# Patient Record
Sex: FEMALE | Race: WHITE | NOT HISPANIC OR LATINO | Employment: UNEMPLOYED | URBAN - METROPOLITAN AREA
[De-identification: names, ages, dates, MRNs, and addresses within clinical notes are randomized per-mention and may not be internally consistent; named-entity substitution may affect disease eponyms.]

---

## 2017-02-23 ENCOUNTER — APPOINTMENT (OUTPATIENT)
Dept: ULTRASOUND IMAGING | Facility: HOSPITAL | Age: 22
End: 2017-02-23

## 2017-02-23 ENCOUNTER — ANESTHESIA (OUTPATIENT)
Dept: LABOR AND DELIVERY | Facility: HOSPITAL | Age: 22
End: 2017-02-23

## 2017-02-23 ENCOUNTER — ANESTHESIA EVENT (OUTPATIENT)
Dept: LABOR AND DELIVERY | Facility: HOSPITAL | Age: 22
End: 2017-02-23

## 2017-02-23 ENCOUNTER — HOSPITAL ENCOUNTER (INPATIENT)
Facility: HOSPITAL | Age: 22
LOS: 2 days | Discharge: HOME | End: 2017-02-25
Attending: EMERGENCY MEDICINE | Admitting: OBSTETRICS & GYNECOLOGY

## 2017-02-23 DIAGNOSIS — O26.899 PREGNANCY RELATED ABDOMINAL PAIN OF LOWER QUADRANT, ANTEPARTUM: ICD-10-CM

## 2017-02-23 DIAGNOSIS — Z3A.34 34 WEEKS GESTATION OF PREGNANCY: Primary | ICD-10-CM

## 2017-02-23 DIAGNOSIS — R10.30 PREGNANCY RELATED ABDOMINAL PAIN OF LOWER QUADRANT, ANTEPARTUM: ICD-10-CM

## 2017-02-23 PROBLEM — Z37.9 NORMAL LABOR: Status: ACTIVE | Noted: 2017-02-23

## 2017-02-23 LAB
ABO GROUP BLD: NORMAL
ABO GROUP BLD: NORMAL
ALBUMIN SERPL-MCNC: 3.9 G/DL (ref 3.2–4.8)
ALBUMIN/GLOB SERPL: 1.3 G/DL (ref 1.5–2.5)
ALP SERPL-CCNC: 124 U/L (ref 25–100)
ALP SERPL-CCNC: 124 U/L (ref 25–100)
ALT SERPL W P-5'-P-CCNC: 10 U/L (ref 7–40)
ALT SERPL W P-5'-P-CCNC: 10 U/L (ref 7–40)
AMPHET+METHAMPHET UR QL: NEGATIVE
AMPHETAMINES UR QL: NEGATIVE
ANION GAP SERPL CALCULATED.3IONS-SCNC: 6 MMOL/L (ref 3–11)
AST SERPL-CCNC: 21 U/L (ref 0–33)
AST SERPL-CCNC: 21 U/L (ref 0–33)
ATMOSPHERIC PRESS: ABNORMAL MMHG
ATMOSPHERIC PRESS: ABNORMAL MMHG
B-HCG UR QL: POSITIVE
BACTERIA UR QL AUTO: ABNORMAL /HPF
BARBITURATES UR QL SCN: NEGATIVE
BASE EXCESS BLDCOA CALC-SCNC: -2.2 MMOL/L (ref 0–2)
BASE EXCESS BLDCOV CALC-SCNC: -2.4 MMOL/L (ref 0–2)
BASOPHILS # BLD AUTO: 0.02 10*3/MM3 (ref 0–0.2)
BASOPHILS # BLD AUTO: 0.03 10*3/MM3 (ref 0–0.2)
BASOPHILS NFR BLD AUTO: 0.1 % (ref 0–1)
BASOPHILS NFR BLD AUTO: 0.2 % (ref 0–1)
BDY SITE: ABNORMAL
BDY SITE: ABNORMAL
BENZODIAZ UR QL SCN: NEGATIVE
BILIRUB SERPL-MCNC: 0.4 MG/DL (ref 0.3–1.2)
BILIRUB SERPL-MCNC: 0.4 MG/DL (ref 0.3–1.2)
BILIRUB UR QL STRIP: NEGATIVE
BLD GP AB SCN SERPL QL: NEGATIVE
BUN BLD-MCNC: 6 MG/DL (ref 9–23)
BUN/CREAT SERPL: 12 (ref 7–25)
BUPRENORPHINE SERPL-MCNC: NEGATIVE NG/ML
CALCIUM SPEC-SCNC: 9.5 MG/DL (ref 8.7–10.4)
CANNABINOIDS SERPL QL: NEGATIVE
CHLORIDE SERPL-SCNC: 109 MMOL/L (ref 99–109)
CLARITY UR: ABNORMAL
CO2 BLDA-SCNC: 24.4 MMOL/L (ref 22–33)
CO2 BLDA-SCNC: 24.9 MMOL/L (ref 22–33)
CO2 SERPL-SCNC: 23 MMOL/L (ref 20–31)
COCAINE UR QL: NEGATIVE
COLOR UR: ABNORMAL
CREAT BLD-MCNC: 0.5 MG/DL (ref 0.6–1.3)
CREAT BLD-MCNC: 0.5 MG/DL (ref 0.6–1.3)
DEPRECATED RDW RBC AUTO: 47.9 FL (ref 37–54)
DEPRECATED RDW RBC AUTO: 48.2 FL (ref 37–54)
EOSINOPHIL # BLD AUTO: 0.01 10*3/MM3 (ref 0.1–0.3)
EOSINOPHIL # BLD AUTO: 0.03 10*3/MM3 (ref 0.1–0.3)
EOSINOPHIL NFR BLD AUTO: 0.1 % (ref 0–3)
EOSINOPHIL NFR BLD AUTO: 0.2 % (ref 0–3)
ERYTHROCYTE [DISTWIDTH] IN BLOOD BY AUTOMATED COUNT: 13.8 % (ref 11.3–14.5)
ERYTHROCYTE [DISTWIDTH] IN BLOOD BY AUTOMATED COUNT: 13.9 % (ref 11.3–14.5)
GFR SERPL CREATININE-BSD FRML MDRD: >150 ML/MIN/1.73
GLOBULIN UR ELPH-MCNC: 3 GM/DL
GLUCOSE BLD-MCNC: 95 MG/DL (ref 70–100)
GLUCOSE UR STRIP-MCNC: NEGATIVE MG/DL
HBV SURFACE AG SERPL QL IA: NORMAL
HCG INTACT+B SERPL-ACNC: NORMAL MIU/ML
HCO3 BLDCOA-SCNC: 23.6 MMOL/L (ref 16.9–20.5)
HCO3 BLDCOV-SCNC: 23.1 MMOL/L (ref 18.6–21.4)
HCT VFR BLD AUTO: 36.5 % (ref 34.5–44)
HCT VFR BLD AUTO: 37.6 % (ref 34.5–44)
HCV AB SER DONR QL: NORMAL
HGB BLD-MCNC: 12.3 G/DL (ref 11.5–15.5)
HGB BLD-MCNC: 12.6 G/DL (ref 11.5–15.5)
HGB BLDA-MCNC: 18.3 G/DL (ref 14–18)
HGB BLDA-MCNC: 18.6 G/DL (ref 14–18)
HGB F BLD QL KLEIH BETKE: NORMAL
HGB UR QL STRIP.AUTO: ABNORMAL
HIV1+2 AB SER QL: NORMAL
HOROWITZ INDEX BLD+IHG-RTO: 21 %
HOROWITZ INDEX BLD+IHG-RTO: 21 %
HYALINE CASTS UR QL AUTO: ABNORMAL /LPF
IMM GRANULOCYTES # BLD: 0.09 10*3/MM3 (ref 0–0.03)
IMM GRANULOCYTES # BLD: 0.09 10*3/MM3 (ref 0–0.03)
IMM GRANULOCYTES NFR BLD: 0.5 % (ref 0–0.6)
IMM GRANULOCYTES NFR BLD: 0.5 % (ref 0–0.6)
INTERNAL NEGATIVE CONTROL: NEGATIVE
INTERNAL POSITIVE CONTROL: POSITIVE
KETONES UR QL STRIP: ABNORMAL
LDH SERPL-CCNC: 191 U/L (ref 120–246)
LEUKOCYTE ESTERASE UR QL STRIP.AUTO: ABNORMAL
LYMPHOCYTES # BLD AUTO: 1.98 10*3/MM3 (ref 0.6–4.8)
LYMPHOCYTES # BLD AUTO: 1.99 10*3/MM3 (ref 0.6–4.8)
LYMPHOCYTES NFR BLD AUTO: 10.1 % (ref 24–44)
LYMPHOCYTES NFR BLD AUTO: 10.6 % (ref 24–44)
Lab: ABNORMAL
MCH RBC QN AUTO: 31.5 PG (ref 27–31)
MCH RBC QN AUTO: 31.8 PG (ref 27–31)
MCHC RBC AUTO-ENTMCNC: 33.5 G/DL (ref 32–36)
MCHC RBC AUTO-ENTMCNC: 33.7 G/DL (ref 32–36)
MCV RBC AUTO: 94 FL (ref 80–99)
MCV RBC AUTO: 94.3 FL (ref 80–99)
METHADONE UR QL SCN: NEGATIVE
MODALITY: ABNORMAL
MODALITY: ABNORMAL
MONOCYTES # BLD AUTO: 0.67 10*3/MM3 (ref 0–1)
MONOCYTES # BLD AUTO: 0.68 10*3/MM3 (ref 0–1)
MONOCYTES NFR BLD AUTO: 3.5 % (ref 0–12)
MONOCYTES NFR BLD AUTO: 3.6 % (ref 0–12)
NEUTROPHILS # BLD AUTO: 15.95 10*3/MM3 (ref 1.5–8.3)
NEUTROPHILS # BLD AUTO: 16.87 10*3/MM3 (ref 1.5–8.3)
NEUTROPHILS NFR BLD AUTO: 85 % (ref 41–71)
NEUTROPHILS NFR BLD AUTO: 85.6 % (ref 41–71)
NITRITE UR QL STRIP: NEGATIVE
NUMBER OF DOSES: NORMAL
OPIATES UR QL: POSITIVE
OXYCODONE UR QL SCN: NEGATIVE
PCO2 BLDCOA: 43.7 MMHG (ref 43.3–54.9)
PCO2 BLDCOV: 41.8 MM HG (ref 30–60)
PCP UR QL SCN: NEGATIVE
PH BLDCOA: 7.34 [PH] (ref 7.2–7.3)
PH BLDCOV: 7.35 [PH] (ref 7.19–7.46)
PH UR STRIP.AUTO: 6.5 [PH] (ref 5–8)
PLATELET # BLD AUTO: 208 10*3/MM3 (ref 150–450)
PLATELET # BLD AUTO: 221 10*3/MM3 (ref 150–450)
PMV BLD AUTO: 11.6 FL (ref 6–12)
PMV BLD AUTO: 11.8 FL (ref 6–12)
PO2 BLDCOA: 24.5 MMHG (ref 11.5–43.3)
PO2 BLDCOV: 26.5 MM HG
POTASSIUM BLD-SCNC: 3.7 MMOL/L (ref 3.5–5.5)
PROPOXYPH UR QL: NEGATIVE
PROT SERPL-MCNC: 6.9 G/DL (ref 5.7–8.2)
PROT UR QL STRIP: ABNORMAL
RBC # BLD AUTO: 3.87 10*6/MM3 (ref 3.89–5.14)
RBC # BLD AUTO: 4 10*6/MM3 (ref 3.89–5.14)
RBC # UR: ABNORMAL /HPF
REF LAB TEST METHOD: ABNORMAL
RH BLD: POSITIVE
RH BLD: POSITIVE
RUBV IGG SER QL: NORMAL
RUBV IGG SER-ACNC: 45.5 IU/ML
SAO2 % BLDCOA: 55.7 %
SAO2 % BLDCOA: ABNORMAL % (ref 45–75)
SAO2 % BLDCOV: 60.9 %
SODIUM BLD-SCNC: 138 MMOL/L (ref 132–146)
SP GR UR STRIP: 1.03 (ref 1–1.03)
SQUAMOUS #/AREA URNS HPF: ABNORMAL /HPF
T4 FREE SERPL-MCNC: 0.98 NG/DL (ref 0.89–1.76)
TRICYCLICS UR QL SCN: NEGATIVE
TSH SERPL DL<=0.05 MIU/L-ACNC: 3.5 MIU/ML (ref 0.35–5.35)
URATE SERPL-MCNC: 3.7 MG/DL (ref 3.1–7.8)
UROBILINOGEN UR QL STRIP: ABNORMAL
WBC NRBC COR # BLD: 18.74 10*3/MM3 (ref 3.5–10.8)
WBC NRBC COR # BLD: 19.67 10*3/MM3 (ref 3.5–10.8)
WBC UR QL AUTO: ABNORMAL /HPF

## 2017-02-23 PROCEDURE — 86901 BLOOD TYPING SEROLOGIC RH(D): CPT

## 2017-02-23 PROCEDURE — 86850 RBC ANTIBODY SCREEN: CPT

## 2017-02-23 PROCEDURE — 99285 EMERGENCY DEPT VISIT HI MDM: CPT

## 2017-02-23 PROCEDURE — 10907ZC DRAINAGE OF AMNIOTIC FLUID, THERAPEUTIC FROM PRODUCTS OF CONCEPTION, VIA NATURAL OR ARTIFICIAL OPENING: ICD-10-PCS | Performed by: NURSE PRACTITIONER

## 2017-02-23 PROCEDURE — 87340 HEPATITIS B SURFACE AG IA: CPT | Performed by: NURSE PRACTITIONER

## 2017-02-23 PROCEDURE — 84460 ALANINE AMINO (ALT) (SGPT): CPT | Performed by: NURSE PRACTITIONER

## 2017-02-23 PROCEDURE — G0432 EIA HIV-1/HIV-2 SCREEN: HCPCS | Performed by: OBSTETRICS & GYNECOLOGY

## 2017-02-23 PROCEDURE — 82247 BILIRUBIN TOTAL: CPT | Performed by: NURSE PRACTITIONER

## 2017-02-23 PROCEDURE — 25010000002 FENTANYL CITRATE (PF) 100 MCG/2ML SOLUTION: Performed by: NURSE ANESTHETIST, CERTIFIED REGISTERED

## 2017-02-23 PROCEDURE — 85460 HEMOGLOBIN FETAL: CPT | Performed by: NURSE PRACTITIONER

## 2017-02-23 PROCEDURE — 59025 FETAL NON-STRESS TEST: CPT

## 2017-02-23 PROCEDURE — 86900 BLOOD TYPING SEROLOGIC ABO: CPT

## 2017-02-23 PROCEDURE — 87086 URINE CULTURE/COLONY COUNT: CPT | Performed by: EMERGENCY MEDICINE

## 2017-02-23 PROCEDURE — 84443 ASSAY THYROID STIM HORMONE: CPT | Performed by: NURSE PRACTITIONER

## 2017-02-23 PROCEDURE — 25010000003 CEFAZOLIN IN DEXTROSE 2-4 GM/100ML-% SOLUTION: Performed by: NURSE PRACTITIONER

## 2017-02-23 PROCEDURE — 82565 ASSAY OF CREATININE: CPT | Performed by: NURSE PRACTITIONER

## 2017-02-23 PROCEDURE — 88307 TISSUE EXAM BY PATHOLOGIST: CPT | Performed by: NURSE PRACTITIONER

## 2017-02-23 PROCEDURE — 84439 ASSAY OF FREE THYROXINE: CPT | Performed by: NURSE PRACTITIONER

## 2017-02-23 PROCEDURE — 10D17ZZ EXTRACTION OF PRODUCTS OF CONCEPTION, RETAINED, VIA NATURAL OR ARTIFICIAL OPENING: ICD-10-PCS | Performed by: NURSE PRACTITIONER

## 2017-02-23 PROCEDURE — 82805 BLOOD GASES W/O2 SATURATION: CPT | Performed by: NURSE PRACTITIONER

## 2017-02-23 PROCEDURE — 76815 OB US LIMITED FETUS(S): CPT

## 2017-02-23 PROCEDURE — 80306 DRUG TEST PRSMV INSTRMNT: CPT | Performed by: PHYSICIAN ASSISTANT

## 2017-02-23 PROCEDURE — 84075 ASSAY ALKALINE PHOSPHATASE: CPT | Performed by: NURSE PRACTITIONER

## 2017-02-23 PROCEDURE — 86803 HEPATITIS C AB TEST: CPT | Performed by: OBSTETRICS & GYNECOLOGY

## 2017-02-23 PROCEDURE — 84702 CHORIONIC GONADOTROPIN TEST: CPT | Performed by: PHYSICIAN ASSISTANT

## 2017-02-23 PROCEDURE — 85025 COMPLETE CBC W/AUTO DIFF WBC: CPT | Performed by: NURSE PRACTITIONER

## 2017-02-23 PROCEDURE — 83615 LACTATE (LD) (LDH) ENZYME: CPT | Performed by: NURSE PRACTITIONER

## 2017-02-23 PROCEDURE — 80053 COMPREHEN METABOLIC PANEL: CPT | Performed by: PHYSICIAN ASSISTANT

## 2017-02-23 PROCEDURE — 81001 URINALYSIS AUTO W/SCOPE: CPT | Performed by: EMERGENCY MEDICINE

## 2017-02-23 PROCEDURE — 84450 TRANSFERASE (AST) (SGOT): CPT | Performed by: NURSE PRACTITIONER

## 2017-02-23 PROCEDURE — 85025 COMPLETE CBC W/AUTO DIFF WBC: CPT | Performed by: PHYSICIAN ASSISTANT

## 2017-02-23 PROCEDURE — 84550 ASSAY OF BLOOD/URIC ACID: CPT | Performed by: NURSE PRACTITIONER

## 2017-02-23 RX ORDER — OXYTOCIN/RINGER'S LACTATE 20/1000 ML
125 PLASTIC BAG, INJECTION (ML) INTRAVENOUS AS NEEDED
Status: DISCONTINUED | OUTPATIENT
Start: 2017-02-23 | End: 2017-02-23 | Stop reason: HOSPADM

## 2017-02-23 RX ORDER — FENTANYL CITRATE 50 UG/ML
INJECTION, SOLUTION INTRAMUSCULAR; INTRAVENOUS AS NEEDED
Status: DISCONTINUED | OUTPATIENT
Start: 2017-02-23 | End: 2017-02-23 | Stop reason: SURG

## 2017-02-23 RX ORDER — ALBUTEROL SULFATE 90 UG/1
2 AEROSOL, METERED RESPIRATORY (INHALATION) EVERY 4 HOURS PRN
Status: ON HOLD | COMMUNITY
End: 2021-06-05

## 2017-02-23 RX ORDER — ONDANSETRON 4 MG/1
4 TABLET, FILM COATED ORAL EVERY 6 HOURS PRN
Status: DISCONTINUED | OUTPATIENT
Start: 2017-02-23 | End: 2017-02-23 | Stop reason: HOSPADM

## 2017-02-23 RX ORDER — SODIUM CHLORIDE 0.9 % (FLUSH) 0.9 %
10 SYRINGE (ML) INJECTION AS NEEDED
Status: DISCONTINUED | OUTPATIENT
Start: 2017-02-23 | End: 2017-02-23

## 2017-02-23 RX ORDER — SODIUM CHLORIDE, SODIUM LACTATE, POTASSIUM CHLORIDE, CALCIUM CHLORIDE 600; 310; 30; 20 MG/100ML; MG/100ML; MG/100ML; MG/100ML
125 INJECTION, SOLUTION INTRAVENOUS CONTINUOUS
Status: DISCONTINUED | OUTPATIENT
Start: 2017-02-23 | End: 2017-02-23

## 2017-02-23 RX ORDER — OXYCODONE HYDROCHLORIDE AND ACETAMINOPHEN 5; 325 MG/1; MG/1
1 TABLET ORAL EVERY 4 HOURS PRN
Status: DISCONTINUED | OUTPATIENT
Start: 2017-02-23 | End: 2017-02-25 | Stop reason: HOSPADM

## 2017-02-23 RX ORDER — ONDANSETRON 2 MG/ML
4 INJECTION INTRAMUSCULAR; INTRAVENOUS EVERY 6 HOURS PRN
Status: DISCONTINUED | OUTPATIENT
Start: 2017-02-23 | End: 2017-02-23 | Stop reason: HOSPADM

## 2017-02-23 RX ORDER — METHYLERGONOVINE MALEATE 0.2 MG/1
200 TABLET ORAL EVERY 6 HOURS SCHEDULED
Status: COMPLETED | OUTPATIENT
Start: 2017-02-23 | End: 2017-02-24

## 2017-02-23 RX ORDER — OXYTOCIN/RINGER'S LACTATE 20/1000 ML
999 PLASTIC BAG, INJECTION (ML) INTRAVENOUS ONCE
Status: COMPLETED | OUTPATIENT
Start: 2017-02-23 | End: 2017-02-23

## 2017-02-23 RX ORDER — HYDROCODONE BITARTRATE AND ACETAMINOPHEN 5; 325 MG/1; MG/1
1 TABLET ORAL EVERY 4 HOURS PRN
Status: DISCONTINUED | OUTPATIENT
Start: 2017-02-23 | End: 2017-02-25 | Stop reason: HOSPADM

## 2017-02-23 RX ORDER — METHYLERGONOVINE MALEATE 0.2 MG/ML
200 INJECTION INTRAVENOUS ONCE AS NEEDED
Status: DISCONTINUED | OUTPATIENT
Start: 2017-02-23 | End: 2017-02-23 | Stop reason: HOSPADM

## 2017-02-23 RX ORDER — CEFAZOLIN SODIUM 2 G/100ML
2 INJECTION, SOLUTION INTRAVENOUS ONCE
Status: COMPLETED | OUTPATIENT
Start: 2017-02-23 | End: 2017-02-23

## 2017-02-23 RX ORDER — ONDANSETRON 2 MG/ML
4 INJECTION INTRAMUSCULAR; INTRAVENOUS EVERY 6 HOURS PRN
Status: DISCONTINUED | OUTPATIENT
Start: 2017-02-23 | End: 2017-02-25 | Stop reason: HOSPADM

## 2017-02-23 RX ORDER — MISOPROSTOL 200 UG/1
800 TABLET ORAL AS NEEDED
Status: DISCONTINUED | OUTPATIENT
Start: 2017-02-23 | End: 2017-02-23 | Stop reason: HOSPADM

## 2017-02-23 RX ORDER — BUPIVACAINE HYDROCHLORIDE 7.5 MG/ML
INJECTION, SOLUTION EPIDURAL; RETROBULBAR AS NEEDED
Status: DISCONTINUED | OUTPATIENT
Start: 2017-02-23 | End: 2017-02-23 | Stop reason: SURG

## 2017-02-23 RX ORDER — MORPHINE SULFATE 2 MG/ML
2 INJECTION, SOLUTION INTRAMUSCULAR; INTRAVENOUS ONCE
Status: DISCONTINUED | OUTPATIENT
Start: 2017-02-23 | End: 2017-02-23

## 2017-02-23 RX ORDER — OXYCODONE HYDROCHLORIDE AND ACETAMINOPHEN 5; 325 MG/1; MG/1
2 TABLET ORAL ONCE
Status: COMPLETED | OUTPATIENT
Start: 2017-02-23 | End: 2017-02-23

## 2017-02-23 RX ORDER — LANOLIN 100 %
OINTMENT (GRAM) TOPICAL
Status: DISCONTINUED | OUTPATIENT
Start: 2017-02-23 | End: 2017-02-25 | Stop reason: HOSPADM

## 2017-02-23 RX ORDER — SODIUM CHLORIDE 0.9 % (FLUSH) 0.9 %
1-10 SYRINGE (ML) INJECTION AS NEEDED
Status: DISCONTINUED | OUTPATIENT
Start: 2017-02-23 | End: 2017-02-23 | Stop reason: HOSPADM

## 2017-02-23 RX ORDER — DOCUSATE SODIUM 100 MG/1
100 CAPSULE, LIQUID FILLED ORAL 2 TIMES DAILY
Status: DISCONTINUED | OUTPATIENT
Start: 2017-02-23 | End: 2017-02-25 | Stop reason: HOSPADM

## 2017-02-23 RX ORDER — ONDANSETRON 2 MG/ML
4 INJECTION INTRAMUSCULAR; INTRAVENOUS ONCE
Status: DISCONTINUED | OUTPATIENT
Start: 2017-02-23 | End: 2017-02-23

## 2017-02-23 RX ORDER — ACETAMINOPHEN 325 MG/1
650 TABLET ORAL EVERY 4 HOURS PRN
Status: DISCONTINUED | OUTPATIENT
Start: 2017-02-23 | End: 2017-02-25 | Stop reason: HOSPADM

## 2017-02-23 RX ORDER — CARBOPROST TROMETHAMINE 250 UG/ML
250 INJECTION, SOLUTION INTRAMUSCULAR AS NEEDED
Status: DISCONTINUED | OUTPATIENT
Start: 2017-02-23 | End: 2017-02-23 | Stop reason: HOSPADM

## 2017-02-23 RX ORDER — IBUPROFEN 600 MG/1
600 TABLET ORAL EVERY 8 HOURS PRN
Status: DISCONTINUED | OUTPATIENT
Start: 2017-02-23 | End: 2017-02-25 | Stop reason: HOSPADM

## 2017-02-23 RX ORDER — SODIUM CHLORIDE 0.9 % (FLUSH) 0.9 %
1-10 SYRINGE (ML) INJECTION AS NEEDED
Status: DISCONTINUED | OUTPATIENT
Start: 2017-02-23 | End: 2017-02-25 | Stop reason: HOSPADM

## 2017-02-23 RX ADMIN — METHYLERGONOVINE MALEATE 200 MCG: 0.2 TABLET ORAL at 23:58

## 2017-02-23 RX ADMIN — IBUPROFEN 600 MG: 600 TABLET ORAL at 23:58

## 2017-02-23 RX ADMIN — IBUPROFEN 600 MG: 600 TABLET ORAL at 18:43

## 2017-02-23 RX ADMIN — PENICILLIN G POTASSIUM 5 MILLION UNITS: 5000000 POWDER, FOR SOLUTION INTRAMUSCULAR; INTRAPLEURAL; INTRATHECAL; INTRAVENOUS at 09:48

## 2017-02-23 RX ADMIN — FENTANYL CITRATE 20 MCG: 50 INJECTION, SOLUTION INTRAMUSCULAR; INTRAVENOUS at 11:00

## 2017-02-23 RX ADMIN — METHYLERGONOVINE MALEATE 200 MCG: 0.2 TABLET ORAL at 19:46

## 2017-02-23 RX ADMIN — CEFAZOLIN SODIUM 2 G: 2 INJECTION, SOLUTION INTRAVENOUS at 12:41

## 2017-02-23 RX ADMIN — OXYCODONE AND ACETAMINOPHEN 1 TABLET: 5; 325 TABLET ORAL at 15:31

## 2017-02-23 RX ADMIN — OXYCODONE AND ACETAMINOPHEN 2 TABLET: 5; 325 TABLET ORAL at 10:35

## 2017-02-23 RX ADMIN — OXYCODONE AND ACETAMINOPHEN 1 TABLET: 5; 325 TABLET ORAL at 19:46

## 2017-02-23 RX ADMIN — Medication 125 ML/HR: at 12:15

## 2017-02-23 RX ADMIN — METHYLERGONOVINE MALEATE 200 MCG: 0.2 TABLET ORAL at 15:31

## 2017-02-23 RX ADMIN — Medication 999 ML/HR: at 11:19

## 2017-02-23 RX ADMIN — IBUPROFEN 600 MG: 600 TABLET ORAL at 12:58

## 2017-02-23 RX ADMIN — SODIUM CHLORIDE, POTASSIUM CHLORIDE, SODIUM LACTATE AND CALCIUM CHLORIDE 1000 ML/HR: 600; 310; 30; 20 INJECTION, SOLUTION INTRAVENOUS at 09:30

## 2017-02-23 RX ADMIN — BUPIVACAINE HYDROCHLORIDE 1.4 ML: 7.5 INJECTION, SOLUTION EPIDURAL; RETROBULBAR at 11:00

## 2017-02-23 RX ADMIN — Medication 125 ML/HR: at 12:08

## 2017-02-23 RX ADMIN — OXYCODONE AND ACETAMINOPHEN 1 TABLET: 5; 325 TABLET ORAL at 23:58

## 2017-02-23 RX ADMIN — SODIUM CHLORIDE, POTASSIUM CHLORIDE, SODIUM LACTATE AND CALCIUM CHLORIDE 3000 ML: 600; 310; 30; 20 INJECTION, SOLUTION INTRAVENOUS at 10:50

## 2017-02-23 RX ADMIN — DOCUSATE SODIUM 100 MG: 100 CAPSULE, LIQUID FILLED ORAL at 18:43

## 2017-02-23 NOTE — ANESTHESIA PROCEDURE NOTES
Spinal Block    Indication:procedure for pain  Performed By  Anesthesiologist: DEVORA GAVIRIA  CRNA: MARU HOBBS  Preanesthetic Checklist  Completed: patient identified, surgical consent, pre-op evaluation, timeout performed, IV checked, risks and benefits discussed and monitors and equipment checked  Spinal Block Prep:  Patient Position:sitting  Sterile Tech:cap, gloves, mask and sterile barriers  Prep:Betadine  Patient Monitoring:blood pressure monitoring, continuous pulse oximetry and EKG  Spinal Block Procedure  Approach:midline  Guidance:palpation technique  Location:L4-L5  Needle Type:Nikki  Needle Gauge:25 G  Fluid Appearance:clear  Post Assessment  Patient Tolerance:patient tolerated the procedure well with no apparent complications  Complications no

## 2017-02-23 NOTE — ANESTHESIA PREPROCEDURE EVALUATION
Anesthesia Evaluation     no history of anesthetic complications:  NPO Status: > 8 hours   Airway   Mallampati: II  TM distance: >3 FB  Neck ROM: full  Dental    (+) poor dentation    Pulmonary    (+) a smoker Current, asthma,   Cardiovascular - negative cardio ROS        Neuro/Psych- negative ROS  GI/Hepatic/Renal/Endo - negative ROS     Musculoskeletal     Abdominal    Substance History      OB/GYN    (+) Pregnant,         Other          Other Comment: Retained placenta after vaginal delivery- anesthesia requested for delivery of placenta                              Anesthesia Plan    ASA 2 - emergent     spinal and ITN     Anesthetic plan and risks discussed with patient.

## 2017-02-24 LAB
BASOPHILS # BLD AUTO: 0.03 10*3/MM3 (ref 0–0.2)
BASOPHILS NFR BLD AUTO: 0.2 % (ref 0–1)
DEPRECATED RDW RBC AUTO: 47.9 FL (ref 37–54)
EOSINOPHIL # BLD AUTO: 0.14 10*3/MM3 (ref 0.1–0.3)
EOSINOPHIL NFR BLD AUTO: 0.9 % (ref 0–3)
ERYTHROCYTE [DISTWIDTH] IN BLOOD BY AUTOMATED COUNT: 13.8 % (ref 11.3–14.5)
HCT VFR BLD AUTO: 32.4 % (ref 34.5–44)
HGB BLD-MCNC: 10.8 G/DL (ref 11.5–15.5)
IMM GRANULOCYTES # BLD: 0.07 10*3/MM3 (ref 0–0.03)
IMM GRANULOCYTES NFR BLD: 0.5 % (ref 0–0.6)
LYMPHOCYTES # BLD AUTO: 3.71 10*3/MM3 (ref 0.6–4.8)
LYMPHOCYTES NFR BLD AUTO: 24.2 % (ref 24–44)
MCH RBC QN AUTO: 31.7 PG (ref 27–31)
MCHC RBC AUTO-ENTMCNC: 33.3 G/DL (ref 32–36)
MCV RBC AUTO: 95 FL (ref 80–99)
MONOCYTES # BLD AUTO: 1.08 10*3/MM3 (ref 0–1)
MONOCYTES NFR BLD AUTO: 7.1 % (ref 0–12)
NEUTROPHILS # BLD AUTO: 10.27 10*3/MM3 (ref 1.5–8.3)
NEUTROPHILS NFR BLD AUTO: 67.1 % (ref 41–71)
PLATELET # BLD AUTO: 176 10*3/MM3 (ref 150–450)
PMV BLD AUTO: 11.7 FL (ref 6–12)
RBC # BLD AUTO: 3.41 10*6/MM3 (ref 3.89–5.14)
RPR SER QL: NORMAL
WBC NRBC COR # BLD: 15.3 10*3/MM3 (ref 3.5–10.8)

## 2017-02-24 PROCEDURE — 85025 COMPLETE CBC W/AUTO DIFF WBC: CPT | Performed by: NURSE PRACTITIONER

## 2017-02-24 RX ADMIN — IBUPROFEN 600 MG: 600 TABLET ORAL at 05:48

## 2017-02-24 RX ADMIN — DOCUSATE SODIUM 100 MG: 100 CAPSULE, LIQUID FILLED ORAL at 19:53

## 2017-02-24 RX ADMIN — METHYLERGONOVINE MALEATE 200 MCG: 0.2 TABLET ORAL at 05:48

## 2017-02-24 RX ADMIN — IBUPROFEN 600 MG: 600 TABLET ORAL at 19:52

## 2017-02-24 RX ADMIN — IBUPROFEN 600 MG: 600 TABLET ORAL at 13:49

## 2017-02-24 RX ADMIN — OXYCODONE AND ACETAMINOPHEN 1 TABLET: 5; 325 TABLET ORAL at 19:53

## 2017-02-24 RX ADMIN — OXYCODONE AND ACETAMINOPHEN 1 TABLET: 5; 325 TABLET ORAL at 05:48

## 2017-02-24 RX ADMIN — OXYCODONE AND ACETAMINOPHEN 1 TABLET: 5; 325 TABLET ORAL at 13:49

## 2017-02-24 NOTE — ANESTHESIA POSTPROCEDURE EVALUATION
Patient: Nichole Ferguson    Procedure Summary     Date Anesthesia Start Anesthesia Stop Room / Location    02/23/17 1054 1124        Procedure Diagnosis Scheduled Providers Provider    LABOR ANALGESIA No diagnosis on file.  Tom Brito MD          Anesthesia Type: spinal, ITN  Last vitals  BP      Temp      Pulse     Resp      SpO2        Post Anesthesia Care and Evaluation    Patient location during evaluation: bedside  Patient participation: complete - patient participated  Level of consciousness: awake  Pain score: 0  Pain management: satisfactory to patient  Airway patency: patent  Anesthetic complications: No anesthetic complications    Cardiovascular status: acceptable  Respiratory status: acceptable  Hydration status: acceptable

## 2017-02-25 VITALS
DIASTOLIC BLOOD PRESSURE: 58 MMHG | SYSTOLIC BLOOD PRESSURE: 107 MMHG | RESPIRATION RATE: 18 BRPM | TEMPERATURE: 98.1 F | HEART RATE: 81 BPM | BODY MASS INDEX: 19.7 KG/M2 | OXYGEN SATURATION: 100 % | WEIGHT: 130 LBS | HEIGHT: 68 IN

## 2017-02-25 PROBLEM — Z37.9 NORMAL LABOR: Status: RESOLVED | Noted: 2017-02-23 | Resolved: 2017-02-25

## 2017-02-25 LAB
BACTERIA SPEC AEROBE CULT: ABNORMAL
BACTERIA SPEC AEROBE CULT: ABNORMAL

## 2017-02-25 RX ORDER — PNV NO.95/FERROUS FUM/FOLIC AC 28MG-0.8MG
1 TABLET ORAL DAILY
Qty: 90 TABLET | Refills: 1 | Status: SHIPPED | OUTPATIENT
Start: 2017-02-25

## 2017-02-25 RX ORDER — PSEUDOEPHEDRINE HCL 30 MG
100 TABLET ORAL 2 TIMES DAILY PRN
Qty: 30 CAPSULE | Refills: 0 | Status: SHIPPED | OUTPATIENT
Start: 2017-02-25

## 2017-02-25 RX ORDER — IBUPROFEN 600 MG/1
600 TABLET ORAL EVERY 6 HOURS PRN
Qty: 60 TABLET | Refills: 1 | Status: ON HOLD | OUTPATIENT
Start: 2017-02-25 | End: 2021-06-05

## 2017-02-25 RX ADMIN — IBUPROFEN 600 MG: 600 TABLET ORAL at 07:51

## 2017-02-25 RX ADMIN — OXYCODONE AND ACETAMINOPHEN 1 TABLET: 5; 325 TABLET ORAL at 07:51

## 2017-02-25 RX ADMIN — DOCUSATE SODIUM 100 MG: 100 CAPSULE, LIQUID FILLED ORAL at 10:27

## 2017-02-27 LAB
CYTO UR: NORMAL
LAB AP CASE REPORT: NORMAL
LAB AP CLINICAL INFORMATION: NORMAL
Lab: NORMAL
PATH REPORT.FINAL DX SPEC: NORMAL
PATH REPORT.GROSS SPEC: NORMAL

## 2019-04-13 ENCOUNTER — ANESTHESIA EVENT (OUTPATIENT)
Dept: LABOR AND DELIVERY | Facility: HOSPITAL | Age: 24
End: 2019-04-13

## 2019-04-13 ENCOUNTER — HOSPITAL ENCOUNTER (INPATIENT)
Facility: HOSPITAL | Age: 24
LOS: 3 days | Discharge: HOME | End: 2019-04-16
Attending: OBSTETRICS & GYNECOLOGY | Admitting: OBSTETRICS & GYNECOLOGY

## 2019-04-13 ENCOUNTER — ANESTHESIA (OUTPATIENT)
Dept: LABOR AND DELIVERY | Facility: HOSPITAL | Age: 24
End: 2019-04-13

## 2019-04-13 PROBLEM — Z37.9 NORMAL LABOR: Status: ACTIVE | Noted: 2019-04-13

## 2019-04-13 LAB
ABO GROUP BLD: NORMAL
BASOPHILS # BLD AUTO: 0.03 10*3/MM3 (ref 0–0.2)
BASOPHILS NFR BLD AUTO: 0.2 % (ref 0–1.5)
BLD GP AB SCN SERPL QL: NEGATIVE
DEPRECATED RDW RBC AUTO: 46.4 FL (ref 37–54)
EOSINOPHIL # BLD AUTO: 0.09 10*3/MM3 (ref 0–0.4)
EOSINOPHIL NFR BLD AUTO: 0.5 % (ref 0.3–6.2)
ERYTHROCYTE [DISTWIDTH] IN BLOOD BY AUTOMATED COUNT: 14.3 % (ref 12.3–15.4)
ETHANOL BLD-MCNC: 13 MG/DL (ref 0–10)
HBV SURFACE AG SERPL QL IA: NORMAL
HCT VFR BLD AUTO: 34.1 % (ref 34–46.6)
HCV AB SER DONR QL: NORMAL
HGB BLD-MCNC: 11.5 G/DL (ref 12–15.9)
HIV1+2 AB SER QL: NORMAL
IMM GRANULOCYTES # BLD AUTO: 0.1 10*3/MM3 (ref 0–0.05)
IMM GRANULOCYTES NFR BLD AUTO: 0.5 % (ref 0–0.5)
LYMPHOCYTES # BLD AUTO: 2.71 10*3/MM3 (ref 0.7–3.1)
LYMPHOCYTES NFR BLD AUTO: 14.3 % (ref 19.6–45.3)
MCH RBC QN AUTO: 29.8 PG (ref 26.6–33)
MCHC RBC AUTO-ENTMCNC: 33.7 G/DL (ref 31.5–35.7)
MCV RBC AUTO: 88.3 FL (ref 79–97)
MONOCYTES # BLD AUTO: 1.22 10*3/MM3 (ref 0.1–0.9)
MONOCYTES NFR BLD AUTO: 6.4 % (ref 5–12)
NEUTROPHILS # BLD AUTO: 14.83 10*3/MM3 (ref 1.4–7)
NEUTROPHILS NFR BLD AUTO: 78.1 % (ref 42.7–76)
PLATELET # BLD AUTO: 191 10*3/MM3 (ref 140–450)
PMV BLD AUTO: 12.2 FL (ref 6–12)
RBC # BLD AUTO: 3.86 10*6/MM3 (ref 3.77–5.28)
RH BLD: POSITIVE
T&S EXPIRATION DATE: NORMAL
WBC NRBC COR # BLD: 18.98 10*3/MM3 (ref 3.4–10.8)

## 2019-04-13 PROCEDURE — 86850 RBC ANTIBODY SCREEN: CPT | Performed by: OBSTETRICS & GYNECOLOGY

## 2019-04-13 PROCEDURE — 85025 COMPLETE CBC W/AUTO DIFF WBC: CPT | Performed by: OBSTETRICS & GYNECOLOGY

## 2019-04-13 PROCEDURE — G0480 DRUG TEST DEF 1-7 CLASSES: HCPCS | Performed by: OBSTETRICS & GYNECOLOGY

## 2019-04-13 PROCEDURE — 25010000002 ROPIVACAINE PER 1 MG: Performed by: ANESTHESIOLOGY

## 2019-04-13 PROCEDURE — G0432 EIA HIV-1/HIV-2 SCREEN: HCPCS | Performed by: OBSTETRICS & GYNECOLOGY

## 2019-04-13 PROCEDURE — 86803 HEPATITIS C AB TEST: CPT | Performed by: OBSTETRICS & GYNECOLOGY

## 2019-04-13 PROCEDURE — 80307 DRUG TEST PRSMV CHEM ANLYZR: CPT | Performed by: OBSTETRICS & GYNECOLOGY

## 2019-04-13 PROCEDURE — 87340 HEPATITIS B SURFACE AG IA: CPT | Performed by: OBSTETRICS & GYNECOLOGY

## 2019-04-13 PROCEDURE — 86592 SYPHILIS TEST NON-TREP QUAL: CPT | Performed by: OBSTETRICS & GYNECOLOGY

## 2019-04-13 PROCEDURE — 80306 DRUG TEST PRSMV INSTRMNT: CPT | Performed by: OBSTETRICS & GYNECOLOGY

## 2019-04-13 PROCEDURE — 25010000002 FENTANYL CITRATE (PF) 100 MCG/2ML SOLUTION: Performed by: ANESTHESIOLOGY

## 2019-04-13 PROCEDURE — 86901 BLOOD TYPING SEROLOGIC RH(D): CPT | Performed by: OBSTETRICS & GYNECOLOGY

## 2019-04-13 PROCEDURE — C1755 CATHETER, INTRASPINAL: HCPCS | Performed by: ANESTHESIOLOGY

## 2019-04-13 PROCEDURE — 86900 BLOOD TYPING SEROLOGIC ABO: CPT | Performed by: OBSTETRICS & GYNECOLOGY

## 2019-04-13 RX ORDER — ONDANSETRON 2 MG/ML
4 INJECTION INTRAMUSCULAR; INTRAVENOUS EVERY 6 HOURS PRN
Status: DISCONTINUED | OUTPATIENT
Start: 2019-04-13 | End: 2019-04-14 | Stop reason: HOSPADM

## 2019-04-13 RX ORDER — FENTANYL CITRATE 50 UG/ML
INJECTION, SOLUTION INTRAMUSCULAR; INTRAVENOUS AS NEEDED
Status: DISCONTINUED | OUTPATIENT
Start: 2019-04-13 | End: 2019-04-14 | Stop reason: SURG

## 2019-04-13 RX ORDER — ROPIVACAINE HYDROCHLORIDE 2 MG/ML
15 INJECTION, SOLUTION EPIDURAL; INFILTRATION; PERINEURAL CONTINUOUS
Status: DISCONTINUED | OUTPATIENT
Start: 2019-04-13 | End: 2019-04-14

## 2019-04-13 RX ORDER — LIDOCAINE HYDROCHLORIDE AND EPINEPHRINE 15; 5 MG/ML; UG/ML
INJECTION, SOLUTION EPIDURAL AS NEEDED
Status: DISCONTINUED | OUTPATIENT
Start: 2019-04-13 | End: 2019-04-14 | Stop reason: SURG

## 2019-04-13 RX ORDER — DIPHENHYDRAMINE HYDROCHLORIDE 50 MG/ML
12.5 INJECTION INTRAMUSCULAR; INTRAVENOUS EVERY 8 HOURS PRN
Status: DISCONTINUED | OUTPATIENT
Start: 2019-04-13 | End: 2019-04-14

## 2019-04-13 RX ORDER — ONDANSETRON 4 MG/1
4 TABLET, FILM COATED ORAL EVERY 6 HOURS PRN
Status: DISCONTINUED | OUTPATIENT
Start: 2019-04-13 | End: 2019-04-14 | Stop reason: HOSPADM

## 2019-04-13 RX ORDER — BUTORPHANOL TARTRATE 1 MG/ML
1 INJECTION, SOLUTION INTRAMUSCULAR; INTRAVENOUS
Status: DISCONTINUED | OUTPATIENT
Start: 2019-04-13 | End: 2019-04-14 | Stop reason: HOSPADM

## 2019-04-13 RX ORDER — METOCLOPRAMIDE HYDROCHLORIDE 5 MG/ML
10 INJECTION INTRAMUSCULAR; INTRAVENOUS ONCE AS NEEDED
Status: DISCONTINUED | OUTPATIENT
Start: 2019-04-13 | End: 2019-04-14

## 2019-04-13 RX ORDER — TRISODIUM CITRATE DIHYDRATE AND CITRIC ACID MONOHYDRATE 500; 334 MG/5ML; MG/5ML
30 SOLUTION ORAL ONCE
Status: DISCONTINUED | OUTPATIENT
Start: 2019-04-13 | End: 2019-04-14

## 2019-04-13 RX ORDER — ACETAMINOPHEN 325 MG/1
650 TABLET ORAL EVERY 4 HOURS PRN
Status: DISCONTINUED | OUTPATIENT
Start: 2019-04-13 | End: 2019-04-14 | Stop reason: HOSPADM

## 2019-04-13 RX ORDER — MAGNESIUM CARB/ALUMINUM HYDROX 105-160MG
30 TABLET,CHEWABLE ORAL ONCE
Status: DISCONTINUED | OUTPATIENT
Start: 2019-04-13 | End: 2019-04-14 | Stop reason: HOSPADM

## 2019-04-13 RX ORDER — ONDANSETRON 2 MG/ML
4 INJECTION INTRAMUSCULAR; INTRAVENOUS ONCE AS NEEDED
Status: DISCONTINUED | OUTPATIENT
Start: 2019-04-13 | End: 2019-04-14

## 2019-04-13 RX ORDER — SODIUM CHLORIDE, SODIUM LACTATE, POTASSIUM CHLORIDE, CALCIUM CHLORIDE 600; 310; 30; 20 MG/100ML; MG/100ML; MG/100ML; MG/100ML
125 INJECTION, SOLUTION INTRAVENOUS CONTINUOUS
Status: DISCONTINUED | OUTPATIENT
Start: 2019-04-13 | End: 2019-04-14

## 2019-04-13 RX ORDER — EPHEDRINE SULFATE/0.9% NACL/PF 25 MG/5 ML
10 SYRINGE (ML) INTRAVENOUS
Status: DISCONTINUED | OUTPATIENT
Start: 2019-04-13 | End: 2019-04-14

## 2019-04-13 RX ADMIN — LIDOCAINE HYDROCHLORIDE AND EPINEPHRINE 2 ML: 15; 5 INJECTION, SOLUTION EPIDURAL at 21:49

## 2019-04-13 RX ADMIN — SODIUM CHLORIDE, POTASSIUM CHLORIDE, SODIUM LACTATE AND CALCIUM CHLORIDE 125 ML/HR: 600; 310; 30; 20 INJECTION, SOLUTION INTRAVENOUS at 22:29

## 2019-04-13 RX ADMIN — SODIUM CHLORIDE, POTASSIUM CHLORIDE, SODIUM LACTATE AND CALCIUM CHLORIDE 999 ML/HR: 600; 310; 30; 20 INJECTION, SOLUTION INTRAVENOUS at 21:10

## 2019-04-13 RX ADMIN — LIDOCAINE HYDROCHLORIDE AND EPINEPHRINE 3 ML: 15; 5 INJECTION, SOLUTION EPIDURAL at 21:47

## 2019-04-13 RX ADMIN — SODIUM CHLORIDE, POTASSIUM CHLORIDE, SODIUM LACTATE AND CALCIUM CHLORIDE 1000 ML: 600; 310; 30; 20 INJECTION, SOLUTION INTRAVENOUS at 20:50

## 2019-04-13 RX ADMIN — ROPIVACAINE HYDROCHLORIDE 13 ML: 5 INJECTION, SOLUTION EPIDURAL; INFILTRATION; PERINEURAL at 21:51

## 2019-04-13 RX ADMIN — FENTANYL CITRATE 100 MCG: 50 INJECTION, SOLUTION INTRAMUSCULAR; INTRAVENOUS at 21:52

## 2019-04-13 RX ADMIN — ROPIVACAINE HYDROCHLORIDE 15 ML/HR: 2 INJECTION, SOLUTION EPIDURAL; INFILTRATION at 21:54

## 2019-04-14 LAB
AMPHET+METHAMPHET UR QL: NEGATIVE
AMPHETAMINES UR QL: NEGATIVE
BARBITURATES UR QL SCN: NEGATIVE
BENZODIAZ UR QL SCN: NEGATIVE
BUPRENORPHINE SERPL-MCNC: NEGATIVE NG/ML
CANNABINOIDS SERPL QL: POSITIVE
COCAINE UR QL: POSITIVE
METHADONE UR QL SCN: NEGATIVE
OPIATES UR QL: POSITIVE
OXYCODONE UR QL SCN: NEGATIVE
PCP UR QL SCN: NEGATIVE
PROPOXYPH UR QL: NEGATIVE
RPR SER QL: NORMAL
TRICYCLICS UR QL SCN: NEGATIVE

## 2019-04-14 PROCEDURE — C1755 CATHETER, INTRASPINAL: HCPCS

## 2019-04-14 PROCEDURE — 59025 FETAL NON-STRESS TEST: CPT

## 2019-04-14 PROCEDURE — 88307 TISSUE EXAM BY PATHOLOGIST: CPT | Performed by: OBSTETRICS & GYNECOLOGY

## 2019-04-14 PROCEDURE — 51702 INSERT TEMP BLADDER CATH: CPT

## 2019-04-14 PROCEDURE — 59409 OBSTETRICAL CARE: CPT | Performed by: OBSTETRICS & GYNECOLOGY

## 2019-04-14 RX ORDER — IBUPROFEN 600 MG/1
600 TABLET ORAL EVERY 6 HOURS PRN
Status: DISCONTINUED | OUTPATIENT
Start: 2019-04-14 | End: 2019-04-16 | Stop reason: HOSPADM

## 2019-04-14 RX ORDER — BISACODYL 10 MG
10 SUPPOSITORY, RECTAL RECTAL DAILY PRN
Status: DISCONTINUED | OUTPATIENT
Start: 2019-04-15 | End: 2019-04-16 | Stop reason: HOSPADM

## 2019-04-14 RX ORDER — METHYLERGONOVINE MALEATE 0.2 MG/ML
200 INJECTION INTRAVENOUS ONCE AS NEEDED
Status: DISCONTINUED | OUTPATIENT
Start: 2019-04-14 | End: 2019-04-14 | Stop reason: HOSPADM

## 2019-04-14 RX ORDER — OXYTOCIN-SODIUM CHLORIDE 0.9% IV SOLN 30 UNIT/500ML 30-0.9/5 UT/ML-%
650 SOLUTION INTRAVENOUS ONCE
Status: COMPLETED | OUTPATIENT
Start: 2019-04-14 | End: 2019-04-14

## 2019-04-14 RX ORDER — MISOPROSTOL 200 UG/1
800 TABLET ORAL AS NEEDED
Status: DISCONTINUED | OUTPATIENT
Start: 2019-04-14 | End: 2019-04-14 | Stop reason: HOSPADM

## 2019-04-14 RX ORDER — ACETAMINOPHEN 325 MG/1
650 TABLET ORAL EVERY 4 HOURS PRN
Status: DISCONTINUED | OUTPATIENT
Start: 2019-04-14 | End: 2019-04-16 | Stop reason: HOSPADM

## 2019-04-14 RX ORDER — SODIUM CHLORIDE 0.9 % (FLUSH) 0.9 %
1-10 SYRINGE (ML) INJECTION AS NEEDED
Status: DISCONTINUED | OUTPATIENT
Start: 2019-04-14 | End: 2019-04-16 | Stop reason: HOSPADM

## 2019-04-14 RX ORDER — CARBOPROST TROMETHAMINE 250 UG/ML
250 INJECTION, SOLUTION INTRAMUSCULAR AS NEEDED
Status: DISCONTINUED | OUTPATIENT
Start: 2019-04-14 | End: 2019-04-14 | Stop reason: HOSPADM

## 2019-04-14 RX ORDER — ZOLPIDEM TARTRATE 5 MG/1
5 TABLET ORAL NIGHTLY PRN
Status: DISCONTINUED | OUTPATIENT
Start: 2019-04-14 | End: 2019-04-16 | Stop reason: HOSPADM

## 2019-04-14 RX ORDER — OXYTOCIN-SODIUM CHLORIDE 0.9% IV SOLN 30 UNIT/500ML 30-0.9/5 UT/ML-%
85 SOLUTION INTRAVENOUS ONCE
Status: DISCONTINUED | OUTPATIENT
Start: 2019-04-14 | End: 2019-04-14 | Stop reason: HOSPADM

## 2019-04-14 RX ADMIN — OXYTOCIN 650 ML/HR: 10 INJECTION INTRAVENOUS at 00:09

## 2019-04-14 RX ADMIN — IBUPROFEN 600 MG: 600 TABLET, FILM COATED ORAL at 05:52

## 2019-04-14 RX ADMIN — Medication: at 05:52

## 2019-04-14 RX ADMIN — ACETAMINOPHEN 650 MG: 325 TABLET, FILM COATED ORAL at 18:50

## 2019-04-14 RX ADMIN — IBUPROFEN 600 MG: 600 TABLET, FILM COATED ORAL at 15:51

## 2019-04-14 NOTE — H&P
FLAVIA Miller  Obstetric History and Physical    Chief Complaint   Patient presents with   • Contractions       Subjective     Patient is a 24 y.o. female  currently at Unknown, who presents with contractions.  Pt denies any hx of knowing she was pregnant.    Her prenatal care is complicated by  insufficient prenatal care .  Her previous obstetric/gynecological history is noted for is non-contributory.    The following portions of the patients history were reviewed and updated as appropriate: current medications, allergies, past medical history, past surgical history, past family history and past social history .       Prenatal Information:  Prenatal Results    The patient does not have a working CARLOS. Some results will not display without a working CARLOS.   Initial Prenatal Labs     Test Value Reference Range Date Time    Hemoglobin        Hematocrit        Platelets        Rubella IgG        Hepatitis B SAg        Hepatitis C Ab        RPR        ABO AB   19    Rh Positive   19    Antibody Screen        HIV        Urine Culture        Gonorrhea        Chlamydia        TSH              2nd and 3rd Trimester     Test Value Reference Range Date Time    Hemoglobin (repeated)        Hematocrit (repeated)        GCT        Antibody Screen (repeated)        GTT Fasting        GTT 1 Hr        GTT 2 Hr        GTT 3 Hr        Group B Strep              Drug Screening     Test Value Reference Range Date Time    Amphetamine Screen        Barbiturate Screen        Benzodiazepine Screen        Methadone Screen        Phencyclidine Screen        Opiates Screen        THC Screen        Cocaine Screen        Propoxyphene Screen        Buprenorphine Screen        Methamphetamine Screen        Oxycodone Screen        Tricyclic Antidepressants Screen              Other (Risk screening)     Test Value Reference Range Date Time    Varicella IgG        Parvovirus IgG        CMV IgG        Cystic Fibrosis         Hemoglobin electrophoresis        NIPT        MSAFP-4        AFP (for NTD only)                  External Prenatal Results    The patient does not have a working CARLOS. Some results will not display without a working CARLOS.   Pregnancy Outside Results - Transcribed From Office Records - See Scanned Records For Details     Test Value Date Time    Hgb       Hct       ABO AB  19    Rh Positive  19    Antibody Screen       Glucose Fasting GTT       Glucose Tolerance Test 1 hour       Glucose Tolerance Test 3 hour       Gonorrhea (discrete)       Chlamydia (discrete)       RPR       VDRL       Syphilis Antibody       Rubella       HBsAg       Herpes Simplex Virus PCR       Herpes Simplex VIrus Culture       HIV       Hep C RNA Quant PCR       Hep C Antibody       AFP       Group B Strep       GBS Susceptibility to Clindamycin       GBS Susceptibility to Erythromycin       Fetal Fibronectin       Genetic Testing, Maternal Blood             Drug Screening     Test Value Date Time    Urine Drug Screen       Amphetamine Screen       Barbiturate Screen       Benzodiazepine Screen       Methadone Screen       Phencyclidine Screen       Opiates Screen       THC Screen       Cocaine Screen       Propoxyphene Screen       Buprenorphine Screen       Methamphetamine Screen       Oxycodone Screen       Tricyclic Antidepressants Screen                    Past OB History:     Obstetric History       T1      L1     SAB1   TAB0   Ectopic0   Molar0   Multiple0   Live Births1       # Outcome Date GA Lbr Vitaly/2nd Weight Sex Delivery Anes PTL Lv   5 Current            4 Para 17:14 2220 g (4 lb 14.3 oz) F Vag-Spont Spinal Y TERRY      Name: MASON VALENCIA      Apgar1:  8                Apgar5: 9   3  09/17/15 36w0d  2268 g (5 lb) M Vag-Spont      2 2014 Term 11 40w0d  3175 g (7 lb) F Vag-Spont             Past Medical History: Past Medical History:   Diagnosis Date   •  Asthma    • Chlamydia       Past Surgical History Past Surgical History:   Procedure Laterality Date   • WISDOM TOOTH EXTRACTION        Family History: History reviewed. No pertinent family history.   Social History:  reports that she has been smoking cigarettes.  She has been smoking about 0.50 packs per day. She does not have any smokeless tobacco history on file.   reports that she does not drink alcohol.   reports that she uses drugs. Drug: Hydrocodone.        Review of Systems      Objective     Vital Signs Range for the last 24 hours  Temperature:     Temp Source:     BP:     Pulse:     Respirations:     SPO2:     O2 Amount (l/min):     O2 Devices     Weight:       Physical Examination: General appearance - alert, well appearing, and in no distress  Mouth - mucous membranes moist, pharynx normal without lesions and tonsils normal  Neck - supple, no significant adenopathy  Chest - clear to auscultation, no wheezes, rales or rhonchi, symmetric air entry  Heart - normal rate, regular rhythm, normal S1, S2, no murmurs, rubs, clicks or gallops  Extremities - no edema, redness or tenderness in the calves or thighs  Skin - normal coloration and turgor, no rashes, no suspicious skin lesions noted    Presentation: vertex   Cervix: Exam by:     Dilation: Cervical Dilation (cm): 7   Effacement:     Station:       Fetal Heart Rate Assessment   Method:     Beats/min:     Baseline:     Variability:     Accels:     Decels:     Tracing Category:       Uterine Assessment   Method:     Frequency (min):     Ctx Count in 10 min:     Duration:     Intensity:     Intensity by IUPC:     Resting Tone:     Resting Tone by IUPC:     Leupp Units:       Laboratory Results:   Radiology Review:   Other Studies:     Assessment/Plan       Normal labor      Assessment & Plan    Assessment:  1.  Intrauterine pregnancy at Unknown gestation with reactive, reassuring fetal status.    2.  labor  without ROM  3.  Obstetrical history  significant for no prenatal care.  4.  GBS status: No results found for: STREPGPB    Plan:  1. Vaginal anticipated  2. Plan of care has been reviewed with patient   3.  Risks, benefits of treatment plan have been discussed.  4.  All questions have been answered.        Luis Man MD  4/13/2019  10:13 PM

## 2019-04-14 NOTE — PLAN OF CARE
Problem: Patient Care Overview  Goal: Plan of Care Review   04/14/19 0339   Coping/Psychosocial   Plan of Care Reviewed With patient   Plan of Care Review   Progress improving   Pain controlled, lochia wnl, vs wnl

## 2019-04-14 NOTE — ANESTHESIA PREPROCEDURE EVALUATION
Anesthesia Evaluation     Patient summary reviewed and Nursing notes reviewed   NPO Solid Status: > 8 hours  NPO Liquid Status: > 8 hours           Airway   Mallampati: II  TM distance: <3 FB  Neck ROM: full  No difficulty expected  Dental      Pulmonary    (+) asthma,   Cardiovascular - negative cardio ROS        Neuro/Psych- negative ROS  GI/Hepatic/Renal/Endo - negative ROS     Musculoskeletal (-) negative ROS    Abdominal    Substance History - negative use     OB/GYN    (+) Pregnant,         Other - negative ROS       ROS/Med Hx Other: No pre- care                Anesthesia Plan    ASA 2     epidural     Anesthetic plan, all risks, benefits, and alternatives have been provided, discussed and informed consent has been obtained with: patient.

## 2019-04-14 NOTE — ANESTHESIA POSTPROCEDURE EVALUATION
Patient: Nichole Ferguson    Procedure Summary     Date:  04/13/19 Room / Location:      Anesthesia Start:  2139 Anesthesia Stop:  04/14/19 0009    Procedure:  LABOR ANALGESIA Diagnosis:      Scheduled Providers:   Provider:  Alexandra Mccray DO    Anesthesia Type:  epidural ASA Status:  2          Anesthesia Type: epidural  Last vitals  BP   127/63 (04/14/19 0700)   Temp   98.8 °F (37.1 °C) (04/14/19 0700)   Pulse   75 (04/14/19 0700)   Resp   16 (04/14/19 0700)     SpO2         Post Anesthesia Care and Evaluation    Patient location during evaluation: bedside  Patient participation: complete - patient participated  Level of consciousness: awake  Pain score: 0  Pain management: satisfactory to patient  Airway patency: patent  Anesthetic complications: No anesthetic complications  PONV Status: none  Cardiovascular status: acceptable and hemodynamically stable  Respiratory status: acceptable  Hydration status: acceptable  Post Neuraxial Block status: Motor and sensory function returned to baseline and No signs or symptoms of PDPH

## 2019-04-14 NOTE — L&D DELIVERY NOTE
[facility]    Delivery     Delivery: Vaginal, Spontaneous     YOB: 2021    Time of Birth:  Gestational Age 12:03 AM   Unknown     Anesthesia:       Delivering clinician:      Forceps?   No   Vacuum? No    Shoulder dystocia present: No        Delivery narrative:  Pt spont delivered a female infant over an intact perineum.  Cord clamping delayed >60 seconds.  Cord blood obtained and placenta del intact.  No epis or lacs.  Uterus explored with no palpable placental fragments    Infant    Findings: female  infant     Infant observations: Weight: No birth weight on file.   Length:    in  Observations/Comments:         Apgars:    @ 1 minute /       @ 5 minutes   Infant Name:      Placenta, Cord, and Fluid    Placenta delivered     at         Cord:    present.   Nuchal Cord?  no   Cord blood obtained:      Cord gases obtained:       Cord gas results: Venous:  No results found for: PHCVEN    Arterial:  No results found for: PHCART     Repair    Episiotomy: Not recorded    Lacerations: No   Estimated Blood Loss:             Complications  none    Disposition  Mother to Mother Baby/Postpartum  in stable condition currently.  Baby to NBN  in stable condition currently.      Luis Man MD  04/14/19  12:17 AM

## 2019-04-14 NOTE — NURSING NOTE
0150- PT UNABLE TO PICK LEGS UP IN THE BED OR LIFT BOTTOM OFF THE BED. PERICARE DONE IN THE BED, PADS CHANGED, GOWN CHANGED.   0200- PT TRANSFERRED TO MOTHER/BABY VIA BED ACCOMPANIED BY RN AND PCT. REPORT GIVEN TO MOTHER/BABY RN. RN TO ASSUME CARE.

## 2019-04-14 NOTE — ANESTHESIA PROCEDURE NOTES
Labor Epidural      Patient reassessed immediately prior to procedure    Patient location during procedure: OB  Performed By  Anesthesiologist: Alexandra Mccray DO  Preanesthetic Checklist  Completed: patient identified, surgical consent, pre-op evaluation, timeout performed, IV checked, risks and benefits discussed and monitors and equipment checked  Prep:  Pt Position:sitting  Sterile Tech:cap, gloves, mask and sterile barrier  Prep:DuraPrep  Monitoring:blood pressure monitoring  Epidural Block Procedure:  Approach:midline  Guidance:palpation technique  Location:L3-L4  Needle Type:Tuohy  Needle Gauge:17 G  Loss of Resistance Medium: air  Loss of Resistance: 4cm  Cath Depth at skin:10 cm  Paresthesia: none  Aspiration:negative  Test Dose:negative  Number of Attempts: 1  Post Assessment:  Dressing:occlusive dressing applied and secured with tape  Pt Tolerance:patient tolerated the procedure well with no apparent complications  Complications:no

## 2019-04-15 LAB
BASOPHILS # BLD AUTO: 0.06 10*3/MM3 (ref 0–0.2)
BASOPHILS NFR BLD AUTO: 0.5 % (ref 0–1.5)
DEPRECATED RDW RBC AUTO: 48 FL (ref 37–54)
EOSINOPHIL # BLD AUTO: 0.24 10*3/MM3 (ref 0–0.4)
EOSINOPHIL NFR BLD AUTO: 1.9 % (ref 0.3–6.2)
ERYTHROCYTE [DISTWIDTH] IN BLOOD BY AUTOMATED COUNT: 14.6 % (ref 12.3–15.4)
HCT VFR BLD AUTO: 32.5 % (ref 34–46.6)
HGB BLD-MCNC: 10.7 G/DL (ref 12–15.9)
IMM GRANULOCYTES # BLD AUTO: 0.08 10*3/MM3 (ref 0–0.05)
IMM GRANULOCYTES NFR BLD AUTO: 0.6 % (ref 0–0.5)
LYMPHOCYTES # BLD AUTO: 3.39 10*3/MM3 (ref 0.7–3.1)
LYMPHOCYTES NFR BLD AUTO: 26.9 % (ref 19.6–45.3)
MCH RBC QN AUTO: 29.6 PG (ref 26.6–33)
MCHC RBC AUTO-ENTMCNC: 32.9 G/DL (ref 31.5–35.7)
MCV RBC AUTO: 90 FL (ref 79–97)
MONOCYTES # BLD AUTO: 1.07 10*3/MM3 (ref 0.1–0.9)
MONOCYTES NFR BLD AUTO: 8.5 % (ref 5–12)
NEUTROPHILS # BLD AUTO: 7.76 10*3/MM3 (ref 1.4–7)
NEUTROPHILS NFR BLD AUTO: 61.6 % (ref 42.7–76)
PLATELET # BLD AUTO: 181 10*3/MM3 (ref 140–450)
PMV BLD AUTO: 11.9 FL (ref 6–12)
RBC # BLD AUTO: 3.61 10*6/MM3 (ref 3.77–5.28)
REF LAB TEST METHOD: NORMAL
REF LAB TEST METHOD: NORMAL
WBC NRBC COR # BLD: 12.6 10*3/MM3 (ref 3.4–10.8)

## 2019-04-15 PROCEDURE — 85025 COMPLETE CBC W/AUTO DIFF WBC: CPT | Performed by: OBSTETRICS & GYNECOLOGY

## 2019-04-15 RX ADMIN — IBUPROFEN 600 MG: 600 TABLET, FILM COATED ORAL at 07:01

## 2019-04-15 RX ADMIN — ACETAMINOPHEN 650 MG: 325 TABLET, FILM COATED ORAL at 17:09

## 2019-04-15 RX ADMIN — IBUPROFEN 600 MG: 600 TABLET, FILM COATED ORAL at 00:30

## 2019-04-15 RX ADMIN — IBUPROFEN 600 MG: 600 TABLET, FILM COATED ORAL at 22:43

## 2019-04-15 RX ADMIN — IBUPROFEN 600 MG: 600 TABLET, FILM COATED ORAL at 17:09

## 2019-04-15 RX ADMIN — ACETAMINOPHEN 650 MG: 325 TABLET, FILM COATED ORAL at 09:54

## 2019-04-15 RX ADMIN — ACETAMINOPHEN 650 MG: 325 TABLET, FILM COATED ORAL at 22:43

## 2019-04-15 NOTE — PROGRESS NOTES
Nichole Ferguson  [id number]  1995    Chief complaint: contractions    S/No complaints, ROS neg for CP, SOB, N ,V  O/97.8, 79, 16, 114/59   Breasts: non-tend, soft   Fund: U-1, firm, mild tend   Loch: R/S    Ext: no calf tend    A/1)PPD #1 stable     2)No PNC  P/1)rout care     2) consult     3)home tomorrow    Luis Man MD  4/15/2019  7:25 AM

## 2019-04-15 NOTE — PLAN OF CARE
Problem: Patient Care Overview  Goal: Plan of Care Review   04/15/19 1547   Coping/Psychosocial   Plan of Care Reviewed With patient   Plan of Care Review   Progress improving

## 2019-04-15 NOTE — PLAN OF CARE
Problem: Patient Care Overview  Goal: Plan of Care Review  Outcome: Ongoing (interventions implemented as appropriate)   04/15/19 0651   OTHER   Outcome Summary VSS. pain controlled. patient and mother educated about NYLA and expectations for mother and infant stays in hospital.     Goal: Individualization and Mutuality  Outcome: Ongoing (interventions implemented as appropriate)    Goal: Discharge Needs Assessment  Outcome: Ongoing (interventions implemented as appropriate)    Goal: Interprofessional Rounds/Family Conf  Outcome: Ongoing (interventions implemented as appropriate)      Problem: Postpartum (Vaginal Delivery) (Adult,Obstetrics,Pediatric)  Goal: Signs and Symptoms of Listed Potential Problems Will be Absent, Minimized or Managed (Postpartum)  Outcome: Ongoing (interventions implemented as appropriate)

## 2019-04-15 NOTE — CONSULTS
Continued Stay Note  FLAVIA Miller     Patient Name: Nichole Ferguson  MRN: [id number]  Today's Date: 4/15/2019    Admit Date: 4/13/2019    Discharge Plan     Row Name 04/15/19 1537       Plan    Plan  Will follow    Plan Comments  Visited pt. She reports she was unaware of pregnancy. Admits to daily Lortab abuse. All of her other children live with relatives. She does not have open CPS case. Her last infant did have NYLA and  admitted to NICU for NYLA. She denies any questions or concerns about NYLA. States FOB is involved but might have cancer. She lives alone. Discussed rehab for mother she states she is interested.  Referral made to CPS WEB ID # [id number]    Final Discharge Disposition Code  01 - home or self-care        Discharge Codes    No documentation.             KANA Christianson

## 2019-04-16 VITALS
SYSTOLIC BLOOD PRESSURE: 129 MMHG | DIASTOLIC BLOOD PRESSURE: 82 MMHG | RESPIRATION RATE: 16 BRPM | HEART RATE: 74 BPM | TEMPERATURE: 98.1 F

## 2019-04-16 PROCEDURE — 99238 HOSP IP/OBS DSCHRG MGMT 30/<: CPT | Performed by: OBSTETRICS & GYNECOLOGY

## 2019-04-16 RX ADMIN — IBUPROFEN 600 MG: 600 TABLET, FILM COATED ORAL at 08:02

## 2019-04-16 RX ADMIN — ACETAMINOPHEN 650 MG: 325 TABLET, FILM COATED ORAL at 03:28

## 2019-04-16 NOTE — PLAN OF CARE
Problem: Patient Care Overview  Goal: Plan of Care Review  Outcome: Outcome(s) achieved Date Met: 04/16/19 04/16/19 1034   Coping/Psychosocial   Plan of Care Reviewed With patient   Plan of Care Review   Progress improving     Goal: Individualization and Mutuality  Outcome: Outcome(s) achieved Date Met: 04/16/19    Goal: Discharge Needs Assessment  Outcome: Outcome(s) achieved Date Met: 04/16/19    Goal: Interprofessional Rounds/Family Conf  Outcome: Outcome(s) achieved Date Met: 04/16/19      Problem: Postpartum (Vaginal Delivery) (Adult,Obstetrics,Pediatric)  Goal: Signs and Symptoms of Listed Potential Problems Will be Absent, Minimized or Managed (Postpartum)  Outcome: Outcome(s) achieved Date Met: 04/16/19

## 2019-04-16 NOTE — PLAN OF CARE
Problem: Patient Care Overview  Goal: Plan of Care Review  Outcome: Ongoing (interventions implemented as appropriate)   04/15/19 0651 04/16/19 0507   Coping/Psychosocial   Plan of Care Reviewed With --  patient   Plan of Care Review   Progress --  improving   OTHER   Outcome Summary VSS. pain controlled. patient and mother educated about NYLA and expectations for mother and infant stays in hospital. --      Goal: Individualization and Mutuality  Outcome: Ongoing (interventions implemented as appropriate)      Problem: Postpartum (Vaginal Delivery) (Adult,Obstetrics,Pediatric)  Goal: Signs and Symptoms of Listed Potential Problems Will be Absent, Minimized or Managed (Postpartum)  Outcome: Ongoing (interventions implemented as appropriate)   04/16/19 0505   Goal/Outcome Evaluation   Problems Assessed (Postpartum Vaginal Delivery) all   Problems Present (Postpartum Vag Deliv) none

## 2019-04-16 NOTE — DISCHARGE SUMMARY
Nichole Ferguson  [id number]  1995    Chief complaint: contractions    Date of admission: 19  Date of discharge: 19    Procedures: vaginal delivery 19    Hospital course: Pt was admitted by EMS on  in spont labor.  Pt labored uneventfully and had an uncomplicated  on the morning of the .  Pt's post partum course has been unremarkable.   consult was made and pt was [facility] by [facility].    S/ No complaints.  ROS neg for HA ,CP, SOB, N, V  O/98.1, 74, 16, 129/82   Breasts: full, non-tend   Fund: U-2, firm , normal tend   Loch: R/S   Ext: no calf tend   Labs: H/H 10.7/32.5    A/1)IUP term delivered     2)no prenatal care     3)polysubstance use  P/1)home     2)f/u with local OB (pending)    3)f/u with  (pt interested in rehab)    4)d/c meds: none    Luis Man MD  2019  9:17 AM

## 2019-04-18 LAB
CYTO UR: NORMAL
LAB AP CASE REPORT: NORMAL
LAB AP CLINICAL INFORMATION: NORMAL
PATH REPORT.FINAL DX SPEC: NORMAL
PATH REPORT.GROSS SPEC: NORMAL
REF LAB TEST METHOD: NORMAL

## 2021-06-05 ENCOUNTER — ANESTHESIA (OUTPATIENT)
Dept: LABOR AND DELIVERY | Facility: HOSPITAL | Age: 26
End: 2021-06-05

## 2021-06-05 ENCOUNTER — HOSPITAL ENCOUNTER (INPATIENT)
Facility: HOSPITAL | Age: 26
LOS: 2 days | Discharge: HOME | End: 2021-06-07
Attending: OBSTETRICS & GYNECOLOGY | Admitting: OBSTETRICS & GYNECOLOGY

## 2021-06-05 ENCOUNTER — ANESTHESIA EVENT (OUTPATIENT)
Dept: LABOR AND DELIVERY | Facility: HOSPITAL | Age: 26
End: 2021-06-05

## 2021-06-05 DIAGNOSIS — Z98.891 STATUS POST CESAREAN SECTION: Primary | ICD-10-CM

## 2021-06-05 PROBLEM — Z34.90 PREGNANCY: Status: ACTIVE | Noted: 2021-06-05

## 2021-06-05 PROBLEM — O42.90 PROM (PREMATURE RUPTURE OF MEMBRANES): Status: ACTIVE | Noted: 2021-06-05

## 2021-06-05 LAB
ABO GROUP BLD: NORMAL
ALP SERPL-CCNC: 166 U/L (ref 39–117)
ALT SERPL W P-5'-P-CCNC: 9 U/L (ref 1–33)
AMPHET+METHAMPHET UR QL: NEGATIVE
AMPHETAMINES UR QL: NEGATIVE
AST SERPL-CCNC: 16 U/L (ref 1–32)
ATMOSPHERIC PRESS: ABNORMAL MM[HG]
ATMOSPHERIC PRESS: ABNORMAL MM[HG]
BARBITURATES UR QL SCN: NEGATIVE
BASE EXCESS BLDCOA CALC-SCNC: -2.1 MMOL/L (ref 0–2)
BASE EXCESS BLDCOV CALC-SCNC: 0.6 MMOL/L (ref 0–2)
BASOPHILS # BLD AUTO: 0.06 10*3/MM3 (ref 0–0.2)
BASOPHILS NFR BLD AUTO: 0.3 % (ref 0–1.5)
BDY SITE: ABNORMAL
BDY SITE: ABNORMAL
BENZODIAZ UR QL SCN: NEGATIVE
BILIRUB SERPL-MCNC: 0.2 MG/DL (ref 0–1.2)
BLD GP AB SCN SERPL QL: NEGATIVE
BODY TEMPERATURE: 37 C
BODY TEMPERATURE: 37 C
BUPRENORPHINE SERPL-MCNC: NEGATIVE NG/ML
CANNABINOIDS SERPL QL: POSITIVE
CO2 BLDA-SCNC: 28.8 MMOL/L (ref 22–33)
CO2 BLDA-SCNC: 29.6 MMOL/L (ref 22–33)
COCAINE UR QL: NEGATIVE
COLLECT TME SMN: ABNORMAL
CREAT SERPL-MCNC: 0.49 MG/DL (ref 0.57–1)
DEPRECATED RDW RBC AUTO: 45.6 FL (ref 37–54)
EOSINOPHIL # BLD AUTO: 0.04 10*3/MM3 (ref 0–0.4)
EOSINOPHIL NFR BLD AUTO: 0.2 % (ref 0.3–6.2)
EPAP: 0
EPAP: 0
ERYTHROCYTE [DISTWIDTH] IN BLOOD BY AUTOMATED COUNT: 13.5 % (ref 12.3–15.4)
HBV SURFACE AG SERPL QL IA: NORMAL
HCO3 BLDCOA-SCNC: 27.6 MMOL/L (ref 16.9–20.5)
HCO3 BLDCOV-SCNC: 27.3 MMOL/L (ref 18.6–21.4)
HCT VFR BLD AUTO: 35.7 % (ref 34–46.6)
HCV AB SER DONR QL: NORMAL
HGB BLD-MCNC: 11.6 G/DL (ref 12–15.9)
HGB BLDA-MCNC: 16.4 G/DL (ref 14–18)
HGB BLDA-MCNC: 18 G/DL (ref 14–18)
HIV1+2 AB SER QL: NORMAL
IMM GRANULOCYTES # BLD AUTO: 0.07 10*3/MM3 (ref 0–0.05)
IMM GRANULOCYTES NFR BLD AUTO: 0.4 % (ref 0–0.5)
INHALED O2 CONCENTRATION: 21 %
INHALED O2 CONCENTRATION: 21 %
IPAP: 0
IPAP: 0
LDH SERPL-CCNC: 189 U/L (ref 135–214)
LYMPHOCYTES # BLD AUTO: 2.33 10*3/MM3 (ref 0.7–3.1)
LYMPHOCYTES NFR BLD AUTO: 13.1 % (ref 19.6–45.3)
MCH RBC QN AUTO: 29.9 PG (ref 26.6–33)
MCHC RBC AUTO-ENTMCNC: 32.5 G/DL (ref 31.5–35.7)
MCV RBC AUTO: 92 FL (ref 79–97)
METHADONE UR QL SCN: NEGATIVE
MODALITY: ABNORMAL
MODALITY: ABNORMAL
MONOCYTES # BLD AUTO: 1.09 10*3/MM3 (ref 0.1–0.9)
MONOCYTES NFR BLD AUTO: 6.1 % (ref 5–12)
NEUTROPHILS NFR BLD AUTO: 14.18 10*3/MM3 (ref 1.7–7)
NEUTROPHILS NFR BLD AUTO: 79.9 % (ref 42.7–76)
NOTE: ABNORMAL
NOTE: ABNORMAL
NRBC BLD AUTO-RTO: 0 /100 WBC (ref 0–0.2)
OPIATES UR QL: NEGATIVE
OXYCODONE UR QL SCN: POSITIVE
PAW @ PEAK INSP FLOW SETTING VENT: 0 CMH2O
PAW @ PEAK INSP FLOW SETTING VENT: 0 CMH2O
PCO2 BLDCOA: 65.7 MMHG (ref 43.3–54.9)
PCO2 BLDCOV: 50.2 MM HG (ref 28–40)
PCP UR QL SCN: NEGATIVE
PH BLDCOA: 7.23 PH UNITS (ref 7.22–7.3)
PH BLDCOV: 7.34 PH UNITS (ref 7.31–7.37)
PLATELET # BLD AUTO: 233 10*3/MM3 (ref 140–450)
PMV BLD AUTO: 12 FL (ref 6–12)
PO2 BLDCOA: ABNORMAL MM[HG]
PO2 BLDCOV: 18.6 MM HG (ref 21–31)
POC AMNISURE: NEGATIVE
PROPOXYPH UR QL: NEGATIVE
RBC # BLD AUTO: 3.88 10*6/MM3 (ref 3.77–5.28)
RH BLD: POSITIVE
SAO2 % BLDCOA: 7 %
SAO2 % BLDCOA: ABNORMAL %
SAO2 % BLDCOV: 43.2 %
SARS-COV-2 RDRP RESP QL NAA+PROBE: NORMAL
T&S EXPIRATION DATE: NORMAL
TOTAL RATE: 0 BREATHS/MINUTE
TOTAL RATE: 0 BREATHS/MINUTE
TRICYCLICS UR QL SCN: NEGATIVE
URATE SERPL-MCNC: 3.8 MG/DL (ref 2.4–5.7)
VENTILATOR MODE: ABNORMAL
WBC # BLD AUTO: 17.77 10*3/MM3 (ref 3.4–10.8)

## 2021-06-05 PROCEDURE — 86900 BLOOD TYPING SEROLOGIC ABO: CPT | Performed by: OBSTETRICS & GYNECOLOGY

## 2021-06-05 PROCEDURE — G0432 EIA HIV-1/HIV-2 SCREEN: HCPCS | Performed by: OBSTETRICS & GYNECOLOGY

## 2021-06-05 PROCEDURE — 87635 SARS-COV-2 COVID-19 AMP PRB: CPT | Performed by: OBSTETRICS & GYNECOLOGY

## 2021-06-05 PROCEDURE — 86696 HERPES SIMPLEX TYPE 2 TEST: CPT | Performed by: OBSTETRICS & GYNECOLOGY

## 2021-06-05 PROCEDURE — 88307 TISSUE EXAM BY PATHOLOGIST: CPT | Performed by: OBSTETRICS & GYNECOLOGY

## 2021-06-05 PROCEDURE — 86762 RUBELLA ANTIBODY: CPT | Performed by: OBSTETRICS & GYNECOLOGY

## 2021-06-05 PROCEDURE — 85025 COMPLETE CBC W/AUTO DIFF WBC: CPT | Performed by: OBSTETRICS & GYNECOLOGY

## 2021-06-05 PROCEDURE — G0480 DRUG TEST DEF 1-7 CLASSES: HCPCS | Performed by: OBSTETRICS & GYNECOLOGY

## 2021-06-05 PROCEDURE — 51703 INSERT BLADDER CATH COMPLEX: CPT

## 2021-06-05 PROCEDURE — 84460 ALANINE AMINO (ALT) (SGPT): CPT | Performed by: OBSTETRICS & GYNECOLOGY

## 2021-06-05 PROCEDURE — 25010000002 KETOROLAC TROMETHAMINE PER 15 MG: Performed by: OBSTETRICS & GYNECOLOGY

## 2021-06-05 PROCEDURE — 84112 EVAL AMNIOTIC FLUID PROTEIN: CPT | Performed by: OBSTETRICS & GYNECOLOGY

## 2021-06-05 PROCEDURE — 86694 HERPES SIMPLEX NES ANTBDY: CPT | Performed by: OBSTETRICS & GYNECOLOGY

## 2021-06-05 PROCEDURE — 59515 CESAREAN DELIVERY: CPT | Performed by: OBSTETRICS & GYNECOLOGY

## 2021-06-05 PROCEDURE — 86901 BLOOD TYPING SEROLOGIC RH(D): CPT | Performed by: OBSTETRICS & GYNECOLOGY

## 2021-06-05 PROCEDURE — 82247 BILIRUBIN TOTAL: CPT | Performed by: OBSTETRICS & GYNECOLOGY

## 2021-06-05 PROCEDURE — 82565 ASSAY OF CREATININE: CPT | Performed by: OBSTETRICS & GYNECOLOGY

## 2021-06-05 PROCEDURE — 25010000002 METOCLOPRAMIDE PER 10 MG: Performed by: ANESTHESIOLOGY

## 2021-06-05 PROCEDURE — 86850 RBC ANTIBODY SCREEN: CPT | Performed by: OBSTETRICS & GYNECOLOGY

## 2021-06-05 PROCEDURE — 25010000002 FENTANYL CITRATE (PF) 50 MCG/ML SOLUTION: Performed by: ANESTHESIOLOGY

## 2021-06-05 PROCEDURE — 82805 BLOOD GASES W/O2 SATURATION: CPT

## 2021-06-05 PROCEDURE — 83615 LACTATE (LD) (LDH) ENZYME: CPT | Performed by: OBSTETRICS & GYNECOLOGY

## 2021-06-05 PROCEDURE — 86695 HERPES SIMPLEX TYPE 1 TEST: CPT | Performed by: OBSTETRICS & GYNECOLOGY

## 2021-06-05 PROCEDURE — 25010000002 ONDANSETRON PER 1 MG: Performed by: ANESTHESIOLOGY

## 2021-06-05 PROCEDURE — 59025 FETAL NON-STRESS TEST: CPT

## 2021-06-05 PROCEDURE — 86593 SYPHILIS TEST NON-TREP QUANT: CPT | Performed by: OBSTETRICS & GYNECOLOGY

## 2021-06-05 PROCEDURE — 86780 TREPONEMA PALLIDUM: CPT | Performed by: OBSTETRICS & GYNECOLOGY

## 2021-06-05 PROCEDURE — 80306 DRUG TEST PRSMV INSTRMNT: CPT | Performed by: OBSTETRICS & GYNECOLOGY

## 2021-06-05 PROCEDURE — 84450 TRANSFERASE (AST) (SGOT): CPT | Performed by: OBSTETRICS & GYNECOLOGY

## 2021-06-05 PROCEDURE — 25010000002 CEFAZOLIN PER 500 MG

## 2021-06-05 PROCEDURE — 94799 UNLISTED PULMONARY SVC/PX: CPT

## 2021-06-05 PROCEDURE — 25010000002 MIDAZOLAM PER 1 MG: Performed by: ANESTHESIOLOGY

## 2021-06-05 PROCEDURE — 87255 GENET VIRUS ISOLATE HSV: CPT | Performed by: OBSTETRICS & GYNECOLOGY

## 2021-06-05 PROCEDURE — 84550 ASSAY OF BLOOD/URIC ACID: CPT | Performed by: OBSTETRICS & GYNECOLOGY

## 2021-06-05 PROCEDURE — S0260 H&P FOR SURGERY: HCPCS | Performed by: OBSTETRICS & GYNECOLOGY

## 2021-06-05 PROCEDURE — 87340 HEPATITIS B SURFACE AG IA: CPT | Performed by: OBSTETRICS & GYNECOLOGY

## 2021-06-05 PROCEDURE — 84075 ASSAY ALKALINE PHOSPHATASE: CPT | Performed by: OBSTETRICS & GYNECOLOGY

## 2021-06-05 PROCEDURE — 86803 HEPATITIS C AB TEST: CPT | Performed by: OBSTETRICS & GYNECOLOGY

## 2021-06-05 PROCEDURE — 80081 OBSTETRIC PANEL INC HIV TSTG: CPT | Performed by: OBSTETRICS & GYNECOLOGY

## 2021-06-05 PROCEDURE — 25010000003 MORPHINE PER 10 MG: Performed by: ANESTHESIOLOGY

## 2021-06-05 RX ORDER — CALCIUM CARBONATE 200(500)MG
1 TABLET,CHEWABLE ORAL EVERY 4 HOURS PRN
Status: DISCONTINUED | OUTPATIENT
Start: 2021-06-05 | End: 2021-06-07 | Stop reason: HOSPADM

## 2021-06-05 RX ORDER — MIDAZOLAM HYDROCHLORIDE 1 MG/ML
INJECTION INTRAMUSCULAR; INTRAVENOUS AS NEEDED
Status: DISCONTINUED | OUTPATIENT
Start: 2021-06-05 | End: 2021-06-05 | Stop reason: SURG

## 2021-06-05 RX ORDER — SODIUM CHLORIDE, SODIUM LACTATE, POTASSIUM CHLORIDE, CALCIUM CHLORIDE 600; 310; 30; 20 MG/100ML; MG/100ML; MG/100ML; MG/100ML
INJECTION, SOLUTION INTRAVENOUS CONTINUOUS PRN
Status: DISCONTINUED | OUTPATIENT
Start: 2021-06-05 | End: 2021-06-05 | Stop reason: SURG

## 2021-06-05 RX ORDER — METHYLERGONOVINE MALEATE 0.2 MG/ML
200 INJECTION INTRAVENOUS AS NEEDED
Status: DISCONTINUED | OUTPATIENT
Start: 2021-06-05 | End: 2021-06-07 | Stop reason: HOSPADM

## 2021-06-05 RX ORDER — DOCUSATE SODIUM 100 MG/1
100 CAPSULE, LIQUID FILLED ORAL 2 TIMES DAILY PRN
Status: DISCONTINUED | OUTPATIENT
Start: 2021-06-05 | End: 2021-06-05 | Stop reason: SDUPTHER

## 2021-06-05 RX ORDER — DIPHENHYDRAMINE HCL 25 MG
25 CAPSULE ORAL EVERY 4 HOURS PRN
Status: DISCONTINUED | OUTPATIENT
Start: 2021-06-05 | End: 2021-06-07 | Stop reason: HOSPADM

## 2021-06-05 RX ORDER — KETOROLAC TROMETHAMINE 15 MG/ML
15 INJECTION, SOLUTION INTRAMUSCULAR; INTRAVENOUS EVERY 6 HOURS
Status: COMPLETED | OUTPATIENT
Start: 2021-06-05 | End: 2021-06-06

## 2021-06-05 RX ORDER — FAMOTIDINE 10 MG/ML
INJECTION, SOLUTION INTRAVENOUS AS NEEDED
Status: DISCONTINUED | OUTPATIENT
Start: 2021-06-05 | End: 2021-06-05 | Stop reason: SURG

## 2021-06-05 RX ORDER — SODIUM CHLORIDE 0.9 % (FLUSH) 0.9 %
3 SYRINGE (ML) INJECTION EVERY 12 HOURS SCHEDULED
Status: DISCONTINUED | OUTPATIENT
Start: 2021-06-05 | End: 2021-06-07 | Stop reason: HOSPADM

## 2021-06-05 RX ORDER — HYDROCORTISONE 25 MG/G
1 CREAM TOPICAL AS NEEDED
Status: DISCONTINUED | OUTPATIENT
Start: 2021-06-05 | End: 2021-06-07 | Stop reason: HOSPADM

## 2021-06-05 RX ORDER — FENTANYL CITRATE 50 UG/ML
INJECTION, SOLUTION INTRAMUSCULAR; INTRAVENOUS
Status: COMPLETED | OUTPATIENT
Start: 2021-06-05 | End: 2021-06-05

## 2021-06-05 RX ORDER — KETOROLAC TROMETHAMINE 30 MG/ML
30 INJECTION, SOLUTION INTRAMUSCULAR; INTRAVENOUS ONCE
Status: COMPLETED | OUTPATIENT
Start: 2021-06-05 | End: 2021-06-05

## 2021-06-05 RX ORDER — OXYCODONE HYDROCHLORIDE 5 MG/1
5 TABLET ORAL EVERY 4 HOURS PRN
Status: DISCONTINUED | OUTPATIENT
Start: 2021-06-05 | End: 2021-06-07 | Stop reason: HOSPADM

## 2021-06-05 RX ORDER — METOCLOPRAMIDE HYDROCHLORIDE 5 MG/ML
INJECTION INTRAMUSCULAR; INTRAVENOUS AS NEEDED
Status: DISCONTINUED | OUTPATIENT
Start: 2021-06-05 | End: 2021-06-05 | Stop reason: SURG

## 2021-06-05 RX ORDER — ACETAMINOPHEN 325 MG/1
650 TABLET ORAL EVERY 6 HOURS
Status: DISCONTINUED | OUTPATIENT
Start: 2021-06-06 | End: 2021-06-07 | Stop reason: HOSPADM

## 2021-06-05 RX ORDER — OXYCODONE HYDROCHLORIDE 5 MG/1
10 TABLET ORAL EVERY 4 HOURS PRN
Status: DISCONTINUED | OUTPATIENT
Start: 2021-06-05 | End: 2021-06-07 | Stop reason: HOSPADM

## 2021-06-05 RX ORDER — LANOLIN
CREAM (ML) TOPICAL
Status: DISCONTINUED | OUTPATIENT
Start: 2021-06-05 | End: 2021-06-07 | Stop reason: HOSPADM

## 2021-06-05 RX ORDER — ALUMINA, MAGNESIA, AND SIMETHICONE 2400; 2400; 240 MG/30ML; MG/30ML; MG/30ML
15 SUSPENSION ORAL EVERY 4 HOURS PRN
Status: DISCONTINUED | OUTPATIENT
Start: 2021-06-05 | End: 2021-06-07 | Stop reason: HOSPADM

## 2021-06-05 RX ORDER — OXYTOCIN-SODIUM CHLORIDE 0.9% IV SOLN 30 UNIT/500ML 30-0.9/5 UT/ML-%
85 SOLUTION INTRAVENOUS ONCE
Status: COMPLETED | OUTPATIENT
Start: 2021-06-05 | End: 2021-06-05

## 2021-06-05 RX ORDER — SODIUM CHLORIDE, SODIUM LACTATE, POTASSIUM CHLORIDE, CALCIUM CHLORIDE 600; 310; 30; 20 MG/100ML; MG/100ML; MG/100ML; MG/100ML
125 INJECTION, SOLUTION INTRAVENOUS CONTINUOUS
Status: DISCONTINUED | OUTPATIENT
Start: 2021-06-05 | End: 2021-06-07 | Stop reason: HOSPADM

## 2021-06-05 RX ORDER — TRISODIUM CITRATE DIHYDRATE AND CITRIC ACID MONOHYDRATE 500; 334 MG/5ML; MG/5ML
30 SOLUTION ORAL ONCE
Status: COMPLETED | OUTPATIENT
Start: 2021-06-05 | End: 2021-06-05

## 2021-06-05 RX ORDER — CEFAZOLIN SODIUM 2 G/100ML
INJECTION, SOLUTION INTRAVENOUS
Status: COMPLETED
Start: 2021-06-05 | End: 2021-06-05

## 2021-06-05 RX ORDER — ONDANSETRON 2 MG/ML
INJECTION INTRAMUSCULAR; INTRAVENOUS AS NEEDED
Status: DISCONTINUED | OUTPATIENT
Start: 2021-06-05 | End: 2021-06-05 | Stop reason: SURG

## 2021-06-05 RX ORDER — ACETAMINOPHEN 500 MG
1000 TABLET ORAL EVERY 6 HOURS
Status: COMPLETED | OUTPATIENT
Start: 2021-06-05 | End: 2021-06-06

## 2021-06-05 RX ORDER — TERBUTALINE SULFATE 1 MG/ML
0.25 INJECTION, SOLUTION SUBCUTANEOUS ONCE
Status: DISCONTINUED | OUTPATIENT
Start: 2021-06-05 | End: 2021-06-07 | Stop reason: HOSPADM

## 2021-06-05 RX ORDER — SODIUM CHLORIDE 0.9 % (FLUSH) 0.9 %
1-10 SYRINGE (ML) INJECTION AS NEEDED
Status: DISCONTINUED | OUTPATIENT
Start: 2021-06-05 | End: 2021-06-07 | Stop reason: HOSPADM

## 2021-06-05 RX ORDER — OXYTOCIN-SODIUM CHLORIDE 0.9% IV SOLN 30 UNIT/500ML 30-0.9/5 UT/ML-%
SOLUTION INTRAVENOUS AS NEEDED
Status: DISCONTINUED | OUTPATIENT
Start: 2021-06-05 | End: 2021-06-05 | Stop reason: SURG

## 2021-06-05 RX ORDER — SIMETHICONE 80 MG
80 TABLET,CHEWABLE ORAL 4 TIMES DAILY PRN
Status: DISCONTINUED | OUTPATIENT
Start: 2021-06-05 | End: 2021-06-07 | Stop reason: HOSPADM

## 2021-06-05 RX ORDER — BUPIVACAINE HYDROCHLORIDE 7.5 MG/ML
INJECTION, SOLUTION INTRASPINAL
Status: COMPLETED | OUTPATIENT
Start: 2021-06-05 | End: 2021-06-05

## 2021-06-05 RX ORDER — NICOTINE 21 MG/24HR
1 PATCH, TRANSDERMAL 24 HOURS TRANSDERMAL
Status: DISCONTINUED | OUTPATIENT
Start: 2021-06-05 | End: 2021-06-07 | Stop reason: HOSPADM

## 2021-06-05 RX ORDER — IBUPROFEN 600 MG/1
600 TABLET ORAL EVERY 6 HOURS
Status: DISCONTINUED | OUTPATIENT
Start: 2021-06-06 | End: 2021-06-07 | Stop reason: HOSPADM

## 2021-06-05 RX ORDER — MISOPROSTOL 200 UG/1
800 TABLET ORAL AS NEEDED
Status: DISCONTINUED | OUTPATIENT
Start: 2021-06-05 | End: 2021-06-07 | Stop reason: HOSPADM

## 2021-06-05 RX ORDER — OXYTOCIN-SODIUM CHLORIDE 0.9% IV SOLN 30 UNIT/500ML 30-0.9/5 UT/ML-%
650 SOLUTION INTRAVENOUS ONCE
Status: DISCONTINUED | OUTPATIENT
Start: 2021-06-05 | End: 2021-06-07 | Stop reason: HOSPADM

## 2021-06-05 RX ORDER — DOCUSATE SODIUM 100 MG/1
100 CAPSULE, LIQUID FILLED ORAL 2 TIMES DAILY PRN
Status: DISCONTINUED | OUTPATIENT
Start: 2021-06-05 | End: 2021-06-07 | Stop reason: HOSPADM

## 2021-06-05 RX ORDER — TRISODIUM CITRATE DIHYDRATE AND CITRIC ACID MONOHYDRATE 500; 334 MG/5ML; MG/5ML
SOLUTION ORAL
Status: COMPLETED
Start: 2021-06-05 | End: 2021-06-05

## 2021-06-05 RX ORDER — METHYLERGONOVINE MALEATE 0.2 MG/ML
200 INJECTION INTRAVENOUS ONCE AS NEEDED
Status: DISCONTINUED | OUTPATIENT
Start: 2021-06-05 | End: 2021-06-07 | Stop reason: HOSPADM

## 2021-06-05 RX ORDER — MORPHINE SULFATE 0.5 MG/ML
INJECTION, SOLUTION EPIDURAL; INTRATHECAL; INTRAVENOUS
Status: COMPLETED | OUTPATIENT
Start: 2021-06-05 | End: 2021-06-05

## 2021-06-05 RX ORDER — LIDOCAINE HYDROCHLORIDE 10 MG/ML
5 INJECTION, SOLUTION EPIDURAL; INFILTRATION; INTRACAUDAL; PERINEURAL AS NEEDED
Status: DISCONTINUED | OUTPATIENT
Start: 2021-06-05 | End: 2021-06-07 | Stop reason: HOSPADM

## 2021-06-05 RX ORDER — ACETAMINOPHEN 500 MG
1000 TABLET ORAL ONCE
Status: COMPLETED | OUTPATIENT
Start: 2021-06-05 | End: 2021-06-05

## 2021-06-05 RX ORDER — CARBOPROST TROMETHAMINE 250 UG/ML
250 INJECTION, SOLUTION INTRAMUSCULAR AS NEEDED
Status: DISCONTINUED | OUTPATIENT
Start: 2021-06-05 | End: 2021-06-07 | Stop reason: HOSPADM

## 2021-06-05 RX ORDER — PRENATAL VIT/IRON FUM/FOLIC AC 27MG-0.8MG
1 TABLET ORAL DAILY
Status: DISCONTINUED | OUTPATIENT
Start: 2021-06-05 | End: 2021-06-07 | Stop reason: HOSPADM

## 2021-06-05 RX ADMIN — MORPHINE SULFATE 0.15 MG: 0.5 INJECTION, SOLUTION EPIDURAL; INTRATHECAL; INTRAVENOUS at 13:35

## 2021-06-05 RX ADMIN — Medication 1 PATCH: at 20:14

## 2021-06-05 RX ADMIN — SODIUM CHLORIDE, POTASSIUM CHLORIDE, SODIUM LACTATE AND CALCIUM CHLORIDE: 600; 310; 30; 20 INJECTION, SOLUTION INTRAVENOUS at 13:46

## 2021-06-05 RX ADMIN — OXYCODONE 10 MG: 5 TABLET ORAL at 15:45

## 2021-06-05 RX ADMIN — BUPIVACAINE HYDROCHLORIDE IN DEXTROSE 1.6 ML: 7.5 INJECTION, SOLUTION SUBARACHNOID at 13:35

## 2021-06-05 RX ADMIN — TRISODIUM CITRATE DIHYDRATE AND CITRIC ACID MONOHYDRATE 30 ML: 500; 334 SOLUTION ORAL at 13:17

## 2021-06-05 RX ADMIN — METOCLOPRAMIDE 10 MG: 5 INJECTION, SOLUTION INTRAMUSCULAR; INTRAVENOUS at 13:38

## 2021-06-05 RX ADMIN — SODIUM CHLORIDE, POTASSIUM CHLORIDE, SODIUM LACTATE AND CALCIUM CHLORIDE 1000 ML: 600; 310; 30; 20 INJECTION, SOLUTION INTRAVENOUS at 13:18

## 2021-06-05 RX ADMIN — SODIUM CITRATE AND CITRIC ACID MONOHYDRATE 30 ML: 500; 334 SOLUTION ORAL at 13:17

## 2021-06-05 RX ADMIN — FENTANYL CITRATE 15 MCG: 50 INJECTION, SOLUTION INTRAMUSCULAR; INTRAVENOUS at 13:35

## 2021-06-05 RX ADMIN — OXYTOCIN 85 ML/HR: 10 INJECTION INTRAVENOUS at 14:54

## 2021-06-05 RX ADMIN — OXYTOCIN 500 ML: 10 INJECTION INTRAVENOUS at 13:50

## 2021-06-05 RX ADMIN — ACETAMINOPHEN 1000 MG: 500 TABLET, FILM COATED ORAL at 13:17

## 2021-06-05 RX ADMIN — OXYCODONE 10 MG: 5 TABLET ORAL at 20:14

## 2021-06-05 RX ADMIN — SODIUM CHLORIDE, POTASSIUM CHLORIDE, SODIUM LACTATE AND CALCIUM CHLORIDE: 600; 310; 30; 20 INJECTION, SOLUTION INTRAVENOUS at 13:15

## 2021-06-05 RX ADMIN — FAMOTIDINE 20 MG: 10 INJECTION, SOLUTION INTRAVENOUS at 13:38

## 2021-06-05 RX ADMIN — SODIUM CHLORIDE, POTASSIUM CHLORIDE, SODIUM LACTATE AND CALCIUM CHLORIDE 125 ML/HR: 600; 310; 30; 20 INJECTION, SOLUTION INTRAVENOUS at 12:20

## 2021-06-05 RX ADMIN — MIDAZOLAM 3 MG: 1 INJECTION INTRAMUSCULAR; INTRAVENOUS at 14:07

## 2021-06-05 RX ADMIN — CEFAZOLIN SODIUM 2 G: 10 INJECTION, POWDER, FOR SOLUTION INTRAVENOUS at 13:17

## 2021-06-05 RX ADMIN — KETOROLAC TROMETHAMINE 15 MG: 15 INJECTION, SOLUTION INTRAMUSCULAR; INTRAVENOUS at 22:46

## 2021-06-05 RX ADMIN — ONDANSETRON 4 MG: 2 INJECTION INTRAMUSCULAR; INTRAVENOUS at 13:38

## 2021-06-05 RX ADMIN — MIDAZOLAM 2 MG: 1 INJECTION INTRAMUSCULAR; INTRAVENOUS at 14:01

## 2021-06-05 RX ADMIN — ACETAMINOPHEN 1000 MG: 500 TABLET, FILM COATED ORAL at 20:13

## 2021-06-05 RX ADMIN — KETOROLAC TROMETHAMINE 30 MG: 30 INJECTION, SOLUTION INTRAMUSCULAR; INTRAVENOUS at 14:54

## 2021-06-05 NOTE — ANESTHESIA POSTPROCEDURE EVALUATION
Patient: Nichole Ferguson    Procedure Summary     Date: 21 Room / Location: [facility] LABOR DELIVERY   [facility] LABOR DELIVERY    Anesthesia Start: 1329 Anesthesia Stop: 1430    Procedure:  SECTION PRIMARY (N/A Abdomen) Diagnosis:     Surgeons: Alessandro Walker DO Provider: Tom Brito MD    Anesthesia Type: epidural ASA Status: 2 - Emergent          Anesthesia Type: epidural    Vitals  Vitals Value Taken Time   BP     Temp     Pulse 90 21 1429   Resp     SpO2 93 % 21 1429   Vitals shown include unvalidated device data.        Post Anesthesia Care and Evaluation    Patient location during evaluation: bedside  Patient participation: complete - patient participated  Level of consciousness: lethargic  Pain score: 0  Pain management: adequate  Airway patency: patent  Anesthetic complications: No anesthetic complications    Cardiovascular status: acceptable  Respiratory status: acceptable  Hydration status: acceptable

## 2021-06-05 NOTE — H&P
[facility]  Obstetric History and Physical    Referring Provider: Alessandro Walker DO      Chief Complaint   Patient presents with   • Leaking Fluid       Subjective     Patient is a 26 y.o. female  currently at 36w4d, who presents with C/O leaking of fluid.  Patient reports a gush of clear fluid approximately an hour and a half ago with 2 subsequent episodes.  Patient denies regular urine activity, vaginal bleeding, fever, recent trauma, or either associated symptoms or concerns.  Patient admits to having some vaginal irritation itching approxiweek ago however did not present for evaluation at the onset.  Prenatal care at [facility].  She denies any complications during  course.  Surgical history is significant for 2 prior term and one  vaginal delivery.  She admits to cigarette use and occasional THC        The following portions of the patients history were reviewed and updated as appropriate: current medications, allergies, past medical history, past surgical history, past family history, past social history and problem list .       Prenatal Information:   Maternal Prenatal Labs  Blood Type No results found for: ABO   Rh Status No results found for: RH   Antibody Screen No results found for: ABSCRN   Gonnorhea No results found for: GCCX   Chlamydia No results found for: CLAMYDCU   RPR No results found for: RPR   Syphilis Antibody No results found for: SYPHILIS   Rubella No results found for: RUBELLAIGGIN   Hepatitis B Surface Antigen No results found for: HEPBSAG   HIV-1 Antibody No results found for: LABHIV1   Hepatitis C Antibody No results found for: HEPCAB   Rapid Urin Drug Screen No results found for: AMPMETHU, BARBITSCNUR, LABBENZSCN, LABMETHSCN, LABOPIASCN, THCURSCR, COCAINEUR, AMPHETSCREEN, PROPOXSCN, BUPRENORSCNU, METAMPSCNUR, OXYCODONESCN, TRICYCLICSCN   Group B Strep Culture No results found for: GBSANTIGEN           External Prenatal Results     Pregnancy Outside  Results - Transcribed From Office Records - See Scanned Records For Details     Test Value Date Time    ABO  AB  19    Rh  Positive  19    Antibody Screen  Negative  19 210    Varicella IgG       Rubella  45.5 IU/mL 17 1147       Immune  17 1147    Hgb  10.7 g/dL 04/15/19 0654    Hct  32.5 % 04/15/19 0654    Glucose Fasting GTT       Glucose Tolerance Test 1 hour       Glucose Tolerance Test 3 hour       Gonorrhea (discrete)       Chlamydia (discrete)       RPR  Non-Reactive  19    VDRL       Syphilis Antibody       HBsAg  Non-Reactive  19    Herpes Simplex Virus PCR       Herpes Simplex VIrus Culture       HIV  Non-Reactive  19    Hep C RNA Quant PCR       Hep C Antibody  Non-Reactive  19    AFP       Group B Strep       GBS Susceptibility to Clindamycin       GBS Susceptibility to Erythromycin       Fetal Fibronectin       Genetic Testing, Maternal Blood             Drug Screening     Test Value Date Time    Urine Drug Screen       Amphetamine Screen  Negative  19 2252    Barbiturate Screen  Negative  19 2252    Benzodiazepine Screen  Negative  19 2252    Methadone Screen  Negative  19 2252    Phencyclidine Screen  Negative  19 2252    Opiates Screen  Positive  19 2252    THC Screen  Positive  19 2252    Cocaine Screen       Propoxyphene Screen  Negative  19 2252    Buprenorphine Screen  Negative  19 2252    Methamphetamine Screen       Oxycodone Screen  Negative  19 2252    Tricyclic Antidepressants Screen  Negative  19 2252          Legend    ^: Historical                          Past OB History:       OB History    Para Term  AB Living   6 4 2 1 1 4   SAB TAB Ectopic Molar Multiple Live Births   1 0 0 0 0 4      # Outcome Date GA Lbr Vitaly/2nd Weight Sex Delivery Anes PTL Lv   6 Current            5 Term 19 38w1d  2895 g (6 lb 6.1 oz) F  Vag-Spont EPI N TERRY      Name: MASON VALENCIA      Apgar1: 8  Apgar5: 9   4 Para 17:14 2220 g (4 lb 14.3 oz) F Vag-Spont Spinal Y TERRY      Name: MASON VALENCIA      Apgar1: 8  Apgar5: 9   3  09/17/15 36w0d  2268 g (5 lb) M Vag-Spont  Y TERRY   2 SAB 2014 Term 11 40w0d  3175 g (7 lb) F Vag-Spont  N TERRY       Past Medical History: Past Medical History:   Diagnosis Date   • Asthma    • Chlamydia       Past Surgical History Past Surgical History:   Procedure Laterality Date   • WISDOM TOOTH EXTRACTION        Family History: No family history on file.   Social History:  reports that she has been smoking cigarettes. She has been smoking about 0.50 packs per day. She does not have any smokeless tobacco history on file.   reports no history of alcohol use.   reports current drug use. Drugs: Hydrocodone and Marijuana.                   General ROS Negative Findings:Headaches, Visual Changes, Epigastric pain, Anorexia, Nausia/Vomiting and Vaginal Bleeding    ROS     All other systems have been reviewed and are neg  Objective       Vital Signs Range for the last 24 hours  Temperature: Temp:  [98 °F (36.7 °C)] 98 °F (36.7 °C)   Temp Source: Temp src: Oral   BP: BP: (123)/(73) 123/73   Pulse: Heart Rate:  [96] 96   Respirations: Resp:  [16] 16   SPO2:     O2 Amount (l/min):     O2 Devices     Weight: Weight:  [68.9 kg (152 lb)] 68.9 kg (152 lb)     Physical Examination:   General:   alert, appears stated age and cooperative   Skin:   normal   HEENT:  Sclera clear   Lungs:   clear to auscultation bilaterally   Heart:   regular rate and rhythm, S1, S2 normal, no murmur, click, rub or gallop   Gastrointestinal:  Abdomen soft, gravid uterus, no guarding benign exam   Lower Extremities:  No edema, calf tenderness   : External genitalia: erosions   Musculoskeletal:   No gross deformities, full range of motion upper lower extremity   Neuro:  No focal deficits,  no clonus     Grossly  ruptured  Ulcerated kissing lesions noted over the labia minora majora.  Painful to touch    Presentation: vtx   Cervix: Exam by:     Dilation:     Effacement:     Station:         Fetal Heart Rate Assessment   Method:     Beats/min:     Baseline:     Varibility:     Accels:     Decels:     Tracing Category:       Uterine Assessment   Method:     Frequency (min):     Ctx Count in 10 min:     Duration:     Intensity:     Intensity by IUPC:     Resting Tone:     Resting Tone by IUPC:     Rosman Units:       Laboratory Results:   Lab Results (last 24 hours)     Procedure Component Value Units Date/Time    POC Amnisure [[id number]]  (Normal) Collected: 21    Specimen: Amniotic Fluid Updated: 21     POC Amnisure negative        Radiology Review:   Imaging Results (Last 24 Hours)     ** No results found for the last 24 hours. **        Other Studies:    Assessment/Plan       Pregnancy    PROM (premature rupture of membranes)        Assessment:  1.  Intrauterine pregnancy at 36w4d weeks gestation with reactive fetal status.    2.  Premature rupture membranes  3.  Clinical evidence of genital herpes.  Discussed with patient at length my findings and recommendations to proceed with primary  to diminish fetal risk.  Discussed with patient at length methodology procedure of a  including infection, bleeding, need for further surgery, damage to bowel or bladder, and risk of anesthesia.  All questions answered patient agreed.  Informed consent obtained.  4.  Tobacco abuse    Plan:  1.  Admit, labs, prep for primary  due to HSV  2. Plan of care has been reviewed with patient.  3.  Risks, benefits of treatment plan have been discussed.  4.  All questions have been answered.  5      Alessandro Walker DO  2021  11:59 EDT

## 2021-06-05 NOTE — OP NOTE
Operative Note    Patient name: Nichole Ferguson  YOB: 1995   MRN: [id number]  Admission Date: 2021  Referring Provider: Alessandro Walker DO    ID: 26 y.o.  at 36w4d    Preoperative Diagnosis:   Patient Active Problem List   Diagnosis   • Normal spontaneous vaginal delivery   • Normal labor   • Pregnancy   • PROM (premature rupture of membranes)   Intrauterine pregnancy at 36 weeks 4 days gestation  Premature rupture membranes  Suspected recurrent genital HSV    Postoperative Diagnosis: Same as above.  Same   Meconium stained amniotic fluid    Procedure(s): primarylow transverse  delivery (2 layer closure)    Surgeons: Surgeon(s) and Role:     * Alessandro Walker,  - Primary    Anesthesia: Spinal    Estimated Blood Loss: 350ml mL    IV Fluids: 1200 mL    Preoperative antibiotic: Ancef (cefazolin) 2 grams    Blood products:   Blood Administration Record (From admission, onward)    None          Pathology:   Order Name Source Comment Collection Info Order Time   BLOOD GAS, ARTERIAL, CORD Umbilical Cord   2021  1:36 PM     Release to patient   Immediate        BLOOD GAS, VENOUS, CORD Umbilical Cord   2021  1:36 PM     Release to patient   Immediate        TISSUE PATHOLOGY EXAM Placenta  Collected By: Kamryn Underwood, RN 2021  1:30 PM     Specimen source(s):   Placenta          Release to patient   Immediate            Drains: Salinas catheter to gravity    Complications: None    Condition: Stable to recovery room                                          Infant:                Gender: female  infant    Weight: 2535 g (5 lb 9.4 oz)     Apgars: 8  @ 1 minute /     9  @ 5 minutes    Cord gases: Venous:    pH, Cord Venous   Date Value Ref Range Status   2021 7.344 7.310 - 7.370 pH Units Final         Arterial:    pH, Cord Arterial   Date Value Ref Range Status   2021 7.23 7.22 - 7.30 pH Units Final            Operative Summary:   After obtaining informed consent  the patient was taken to the operating room where adequate anesthesia was obtained.  Salinas catheter was placed in the bladder preoperatively.  IV antibiotics were given preoperatively.       The abdomen was prepped and draped in the usual sterile fashion for  delivery.  After confirming adequate anesthesia a Pfannenstiel skin incision was made with the scalpel and carried through to the underlying layer of fascia.  The fascia was incised in the midline and the incision extended laterally with the Flores scissors and with blunt dissection.       The upper aspect of the fascia was grasped with 2 Kocher clamps, elevated, and dissected off the underlying rectus muscles bluntly and with the Flores scissors.  The Kocher clamps were removed and applied to the inferior aspect of the fascia.  The fascia was dissected off of the rectus muscles in the same fashion.  The peritoneum was entered bluntly.  The incision was stretched and the bladder blade and Forbes retractor inserted for visualization of the uterus.       The uterus was incised with the scalpel in a low transverse fashion.  The uterine incision was entered digitally and the incision extended bluntly in a cranial-caudal fashion.  Retractors were removed and membranes were ruptured.  The infant was delivered atraumatically from vertex presentation   presentation.  The umbilical cord was milked 4 times.  The umbilical cord was clamped and cut and the nose and mouth bulb suctioned.  The infant was handed off to waiting pediatric staff.       Cord blood gases were collected from a clamped segment of umbilical cord.  Cord blood was collected.  The placenta was removed using cord traction and uterine massage.  The uterus was exteriorized and cleared of all clots and debris.  The uterine incision was repaired with #1 Chromic gut in a running locked fashion. A double-layer technique was used.  Additional hemostatic measures required: none.    The incision was  inspected and excellent hemostasis was noted.  The tubes and ovaries were noted to be normal.  The appendix was not visualized..  The uterus was returned to the abdomen.  The gutters were cleared of all clots and debris.  Irrigation was used.  The uterine incision was again inspected and found to be hemostatic.       The peritoneum was reapproximated with #1 Chromic gut.  The fascia was closed with 0 PDS in a running fashion.  The subcutaneous space was reapproximated using 3-0 Plain gut.      The skin was closed with ensorb stapler.  The patient was transferred to the recovery room in stable condition.

## 2021-06-05 NOTE — ANESTHESIA PROCEDURE NOTES
Spinal Block      Patient reassessed immediately prior to procedure    Patient location during procedure: OR  Start Time: 6/5/2021 1:34 PM  Stop Time: 6/5/2021 1:35 PM  Indication:at surgeon's request  Performed By  Anesthesiologist: Tom Brito MD  Preanesthetic Checklist  Completed: patient identified, IV checked, site marked, risks and benefits discussed, surgical consent, monitors and equipment checked, pre-op evaluation and timeout performed  Spinal Block Prep:  Sterile Tech:cap, gloves, sterile barriers and mask  Prep:Betadine  Patient Monitoring:EKG, continuous pulse oximetry and blood pressure monitoring  Spinal Block Procedure  Guidance:palpation technique  Needle Type:Nikki  Placement of Spinal needle event:cerebrospinal fluid aspirated  Paresthesia: no  Fluid Appearance:clear  Medications: bupivacaine in dextrose (MARCAINE SPINAL / Hyberbaric) 0.75-8.25 % injection, 1.6 mL  morphine PF (ASTRAMORPH) injection, 0.15 mg  fentaNYL citrate (PF) (SUBLIMAZE) injection, 15 mcg  Med Administered at 6/5/2021 1:35 PM   Post Assessment  Patient Tolerance:patient tolerated the procedure well with no apparent complications  Complications no

## 2021-06-06 LAB
BASOPHILS # BLD AUTO: 0.05 10*3/MM3 (ref 0–0.2)
BASOPHILS NFR BLD AUTO: 0.3 % (ref 0–1.5)
DEPRECATED RDW RBC AUTO: 53.8 FL (ref 37–54)
EOSINOPHIL # BLD AUTO: 0.04 10*3/MM3 (ref 0–0.4)
EOSINOPHIL NFR BLD AUTO: 0.3 % (ref 0.3–6.2)
ERYTHROCYTE [DISTWIDTH] IN BLOOD BY AUTOMATED COUNT: 13.6 % (ref 12.3–15.4)
HCT VFR BLD AUTO: 35.5 % (ref 34–46.6)
HGB BLD-MCNC: 10 G/DL (ref 12–15.9)
HSV1 IGG SER IA-ACNC: 36 INDEX (ref 0–0.9)
HSV2 IGG SER IA-ACNC: <0.91 INDEX (ref 0–0.9)
IMM GRANULOCYTES # BLD AUTO: 0.11 10*3/MM3 (ref 0–0.05)
IMM GRANULOCYTES NFR BLD AUTO: 0.7 % (ref 0–0.5)
LYMPHOCYTES # BLD AUTO: 1.45 10*3/MM3 (ref 0.7–3.1)
LYMPHOCYTES NFR BLD AUTO: 9.6 % (ref 19.6–45.3)
MCH RBC QN AUTO: 30.2 PG (ref 26.6–33)
MCHC RBC AUTO-ENTMCNC: 28.2 G/DL (ref 31.5–35.7)
MCV RBC AUTO: 107.3 FL (ref 79–97)
MONOCYTES # BLD AUTO: 1.1 10*3/MM3 (ref 0.1–0.9)
MONOCYTES NFR BLD AUTO: 7.3 % (ref 5–12)
NEUTROPHILS NFR BLD AUTO: 12.32 10*3/MM3 (ref 1.7–7)
NEUTROPHILS NFR BLD AUTO: 81.8 % (ref 42.7–76)
NRBC BLD AUTO-RTO: 0 /100 WBC (ref 0–0.2)
PLATELET # BLD AUTO: 213 10*3/MM3 (ref 140–450)
PMV BLD AUTO: 11.9 FL (ref 6–12)
RBC # BLD AUTO: 3.31 10*6/MM3 (ref 3.77–5.28)
RUBV IGG SERPL IA-ACNC: 1.49 INDEX
WBC # BLD AUTO: 15.07 10*3/MM3 (ref 3.4–10.8)

## 2021-06-06 PROCEDURE — 25010000002 KETOROLAC TROMETHAMINE PER 15 MG: Performed by: OBSTETRICS & GYNECOLOGY

## 2021-06-06 PROCEDURE — 85025 COMPLETE CBC W/AUTO DIFF WBC: CPT | Performed by: OBSTETRICS & GYNECOLOGY

## 2021-06-06 PROCEDURE — 0503F POSTPARTUM CARE VISIT: CPT | Performed by: OBSTETRICS & GYNECOLOGY

## 2021-06-06 RX ADMIN — SIMETHICONE 80 MG: 80 TABLET, CHEWABLE ORAL at 22:42

## 2021-06-06 RX ADMIN — Medication 1 PATCH: at 09:09

## 2021-06-06 RX ADMIN — OXYCODONE 10 MG: 5 TABLET ORAL at 06:24

## 2021-06-06 RX ADMIN — KETOROLAC TROMETHAMINE 15 MG: 15 INJECTION, SOLUTION INTRAMUSCULAR; INTRAVENOUS at 04:04

## 2021-06-06 RX ADMIN — KETOROLAC TROMETHAMINE 15 MG: 15 INJECTION, SOLUTION INTRAMUSCULAR; INTRAVENOUS at 17:00

## 2021-06-06 RX ADMIN — OXYCODONE 10 MG: 5 TABLET ORAL at 18:19

## 2021-06-06 RX ADMIN — GUAIFENESIN 200 MG: 200 SOLUTION ORAL at 14:42

## 2021-06-06 RX ADMIN — KETOROLAC TROMETHAMINE 15 MG: 15 INJECTION, SOLUTION INTRAMUSCULAR; INTRAVENOUS at 10:25

## 2021-06-06 RX ADMIN — IBUPROFEN 600 MG: 600 TABLET, FILM COATED ORAL at 22:30

## 2021-06-06 RX ADMIN — GUAIFENESIN 200 MG: 200 SOLUTION ORAL at 10:25

## 2021-06-06 RX ADMIN — OXYCODONE 10 MG: 5 TABLET ORAL at 10:25

## 2021-06-06 RX ADMIN — PRENATAL VITAMINS-IRON FUMARATE 27 MG IRON-FOLIC ACID 0.8 MG TABLET 1 TABLET: at 09:08

## 2021-06-06 RX ADMIN — ACETAMINOPHEN 1000 MG: 500 TABLET, FILM COATED ORAL at 14:42

## 2021-06-06 RX ADMIN — ACETAMINOPHEN 1000 MG: 500 TABLET, FILM COATED ORAL at 02:03

## 2021-06-06 RX ADMIN — OXYCODONE 10 MG: 5 TABLET ORAL at 22:30

## 2021-06-06 RX ADMIN — OXYCODONE 10 MG: 5 TABLET ORAL at 14:41

## 2021-06-06 RX ADMIN — ACETAMINOPHEN 1000 MG: 500 TABLET, FILM COATED ORAL at 09:08

## 2021-06-06 RX ADMIN — DOCUSATE SODIUM 100 MG: 100 CAPSULE, LIQUID FILLED ORAL at 22:42

## 2021-06-06 RX ADMIN — ACETAMINOPHEN 650 MG: 325 TABLET, FILM COATED ORAL at 20:32

## 2021-06-06 RX ADMIN — OXYCODONE 10 MG: 5 TABLET ORAL at 00:17

## 2021-06-06 NOTE — SIGNIFICANT NOTE
Pt states the the father of her baby is no longer her boyfriend. She states she does not want him to know about her medical care, specifically in regards to herpes. She states she was with him for years but has had a new boyfriend for the past few months.

## 2021-06-06 NOTE — PROGRESS NOTES
Postpartum Progress Note    Patient name: Nichole Ferguson  YOB: 1995   MRN: [id number]  Referring Provider: Alessandro Walker DO  Admission Date: 2021  Date of Service: 2021    ID: 26 y.o.     Diagnosis:   S/p  delivery 1 Day Post-Op   Patient Active Problem List   Diagnosis   • Normal spontaneous vaginal delivery   • Normal labor   • Pregnancy   • PROM (premature rupture of membranes)       Subjective:      No complaints.  Light lochia.  No flatus  Ambulating, voiding, tolerating diet.  Pain well controlled.  It is unknown whether the patient is currently breastfeeding.   This baby is in room    Objective:      Vital signs:  Vital Signs Range for the last 24 hours  Temperature: Temp:  [97.5 °F (36.4 °C)-98.7 °F (37.1 °C)] 98.1 °F (36.7 °C)   Temp Source: Temp src: Oral   BP: BP: ()/(47-73) 115/65   Pulse: Heart Rate:  [67-99] 83   Respirations: Resp:  [16] 16   Weight: 68.9 kg (152 lb)     General: Alert & oriented x4, in no apparent distress  Abdomen: soft, nontender  Uterus: firm, nontender  Incision: clean, dry, intact, dressing clean, staples  Extremities: nontender; no edema      Labs:  Lab Results   Component Value Date    WBC 15.07 (H) 2021    HGB 10.0 (L) 2021    HCT 35.5 2021    .3 (H) 2021     2021     Results from last 7 days   Lab Units 21  1221   ABO TYPING  AB   RH TYPING  Positive     External Prenatal Results     Pregnancy Outside Results - Transcribed From Office Records - See Scanned Records For Details     Test Value Date Time    ABO  AB  21 1221    Rh  Positive  21 1221    Antibody Screen  Negative  21 1221    Varicella IgG       Rubella  1.49 index 21 1221    Hgb  10.0 g/dL 21 0847       11.6 g/dL 21 1221    Hct  35.5 % 21 0847       35.7 % 21 1221    Glucose Fasting GTT       Glucose Tolerance Test 1 hour       Glucose Tolerance Test 3 hour        Gonorrhea (discrete)       Chlamydia (discrete)       RPR  Non-Reactive  19 2110    VDRL       Syphilis Antibody       HBsAg  Non-Reactive  21 1221    Herpes Simplex Virus PCR       Herpes Simplex VIrus Culture       HIV  Non-Reactive  21 1221    Hep C RNA Quant PCR       Hep C Antibody  Non-Reactive  21 1221    AFP       Group B Strep       GBS Susceptibility to Clindamycin       GBS Susceptibility to Erythromycin       Fetal Fibronectin       Genetic Testing, Maternal Blood             Drug Screening     Test Value Date Time    Urine Drug Screen       Amphetamine Screen  Negative  21 1219    Barbiturate Screen  Negative  21 1219    Benzodiazepine Screen  Negative  21 1219    Methadone Screen  Negative  21 1219    Phencyclidine Screen  Negative  21 1219    Opiates Screen  Negative  21 1219    THC Screen  Positive  21 1219    Cocaine Screen       Propoxyphene Screen  Negative  21 1219    Buprenorphine Screen  Negative  21 1219    Methamphetamine Screen       Oxycodone Screen  Positive  21 1219    Tricyclic Antidepressants Screen  Negative  21 1219          Legend    ^: Historical                        Assessment/Plan:      1 Day Post-Op s/p Procedure(s):   SECTION PRIMARY  1. S/p primary low transverse  delivery due to suspected genital herpes premature rupture membranes - continue postoperative care.  Doing well.  2.  Limited prenatal care ([facility]  cl[facility]  3. suspected recurrent genital herpes  4.  Mild anemia postpartum hemoglobin stable at 10  5.  History of substance abuse  6.  Tobacco abuse  PLAN  1.  Advance diet  2.  Encourage ambulation and incentive spirometer use  3.  Anticipate full recovery  4.   evaluation pending   Questions were answered.

## 2021-06-06 NOTE — ANESTHESIA POSTPROCEDURE EVALUATION
Patient: Nichole Ferguson    Procedure Summary     Date: 21 Room / Location: [facility] LABOR DELIVERY   [facility]OR DELIVERY    Anesthesia Start: 1329 Anesthesia Stop: 1430    Procedure:  SECTION PRIMARY (N/A Abdomen) Diagnosis:     Surgeons: Alessandro Walker DO Provider: Tom Brito MD    Anesthesia Type: epidural ASA Status: 2 - Emergent          Anesthesia Type: epidural    Vitals  Vitals Value Taken Time   /68 21 1609   Temp 98.5 °F (36.9 °C) 21 1609   Pulse 104 21 1609   Resp 16 21 1609   SpO2 98 % 21 1531   Vitals shown include unvalidated device data.        Post Anesthesia Care and Evaluation    Patient location during evaluation: bedside  Patient participation: complete - patient participated  Level of consciousness: awake  Pain score: 8  Pain management: satisfactory to patient  Airway patency: patent  Anesthetic complications: No anesthetic complications  PONV Status: none  Cardiovascular status: acceptable and hemodynamically stable  Respiratory status: acceptable  Hydration status: acceptable  Post Neuraxial Block status: Motor and sensory function returned to baseline and No signs or symptoms of PDPH

## 2021-06-07 VITALS
SYSTOLIC BLOOD PRESSURE: 115 MMHG | OXYGEN SATURATION: 96 % | BODY MASS INDEX: 23.04 KG/M2 | HEIGHT: 68 IN | HEART RATE: 73 BPM | RESPIRATION RATE: 14 BRPM | WEIGHT: 152 LBS | DIASTOLIC BLOOD PRESSURE: 68 MMHG | TEMPERATURE: 98.9 F

## 2021-06-07 LAB
HSV1+2 IGM SER IA-ACNC: 1.22 RATIO (ref 0–0.9)
RPR SER QL: REACTIVE
RPR SER-TITR: ABNORMAL {TITER}

## 2021-06-07 PROCEDURE — 0503F POSTPARTUM CARE VISIT: CPT | Performed by: OBSTETRICS & GYNECOLOGY

## 2021-06-07 RX ORDER — ACYCLOVIR 400 MG/1
400 TABLET ORAL 2 TIMES DAILY
Qty: 60 TABLET | Refills: 12 | Status: SHIPPED | OUTPATIENT
Start: 2021-06-07

## 2021-06-07 RX ORDER — IBUPROFEN 600 MG/1
600 TABLET ORAL EVERY 6 HOURS
Qty: 50 TABLET | Refills: 1 | Status: SHIPPED | OUTPATIENT
Start: 2021-06-07

## 2021-06-07 RX ORDER — OXYCODONE HYDROCHLORIDE 5 MG/1
5 TABLET ORAL EVERY 4 HOURS PRN
Qty: 15 TABLET | Refills: 0 | Status: SHIPPED | OUTPATIENT
Start: 2021-06-07 | End: 2021-06-12

## 2021-06-07 RX ADMIN — OXYCODONE 10 MG: 5 TABLET ORAL at 02:25

## 2021-06-07 RX ADMIN — PRENATAL VITAMINS-IRON FUMARATE 27 MG IRON-FOLIC ACID 0.8 MG TABLET 1 TABLET: at 08:40

## 2021-06-07 RX ADMIN — IBUPROFEN 600 MG: 600 TABLET, FILM COATED ORAL at 11:42

## 2021-06-07 RX ADMIN — Medication 1 PATCH: at 08:41

## 2021-06-07 RX ADMIN — SIMETHICONE 80 MG: 80 TABLET, CHEWABLE ORAL at 08:41

## 2021-06-07 RX ADMIN — OXYCODONE 10 MG: 5 TABLET ORAL at 07:05

## 2021-06-07 RX ADMIN — ACETAMINOPHEN 650 MG: 325 TABLET, FILM COATED ORAL at 07:06

## 2021-06-07 RX ADMIN — GUAIFENESIN 200 MG: 200 SOLUTION ORAL at 07:16

## 2021-06-07 RX ADMIN — IBUPROFEN 600 MG: 600 TABLET, FILM COATED ORAL at 05:09

## 2021-06-07 RX ADMIN — DOCUSATE SODIUM 100 MG: 100 CAPSULE, LIQUID FILLED ORAL at 08:40

## 2021-06-07 RX ADMIN — OXYCODONE 10 MG: 5 TABLET ORAL at 11:42

## 2021-06-07 RX ADMIN — ACETAMINOPHEN 650 MG: 325 TABLET, FILM COATED ORAL at 02:25

## 2021-06-07 NOTE — PAYOR COMM NOTE
"Nichole Valencia (26 y.o. Female)     Auth#[id number]    Discharged 21.    From:Constanza Nelson  #[phone]  Fax#[phone]      Date of Birth Social Security Number Address Home Phone MRN    1995  [address] [place] [address] [phone] [id number]    [employer] Marital Status          None Single       Admission Date Admission Type Admitting Provider Attending Provider Department, Room/Bed    21 Elective Alessandro Walker DO  [facility] MOTHER BABY 4A, N421/1    Discharge Date Discharge Disposition Discharge Destination        2021 Home or Self Care              Attending Provider: (none)   Allergies: No Known Allergies    Isolation: None   Infection: None   Code Status: Prior    Ht: 172.7 cm (68\")   Wt: 68.9 kg (152 lb)    Admission Cmt: None   Principal Problem: None                Active Insurance as of 2021     Primary Coverage     Payor Plan Insurance Group Employer/Plan Group    [employer] [place] [employer] [place]      Payor Plan Address Payor Plan Phone Number Payor Plan Fax Number Effective Dates    [address] [address] [phone]  2019 - None Entered    [facility] [address]       Subscriber Name Subscriber Birth Date Member ID       NICHOLE VALENCIA 1995 [id number]                 Emergency Contacts      (Rel.) Home Phone Work Phone Mobile Phone    CLARISA VIEIRA (Mother) -- -- [phone]               Operative/Procedure Notes (last 7 days) (Notes from 21 1800 through 21 1800)      Alessandro Walker DO at 21 1431          Operative Note    Patient name: Nichole Valencia  YOB: 1995   MRN: [id number]  Admission Date: 2021  Referring Provider: Alessandro Walker DO    ID: 26 y.o.  at 36w4d    Preoperative Diagnosis:   Patient Active Problem List   Diagnosis   • Normal spontaneous vaginal delivery   • Normal labor   • Pregnancy   • PROM (premature rupture of membranes)   Intrauterine pregnancy at " 36 weeks 4 days gestation  Premature rupture membranes  Suspected recurrent genital HSV    Postoperative Diagnosis: Same as above.  Same   Meconium stained amniotic fluid    Procedure(s): primarylow transverse  delivery (2 layer closure)    Surgeons: Surgeon(s) and Role:     * Alessandro Walker, DO - Primary    Anesthesia: Spinal    Estimated Blood Loss: 350ml mL    IV Fluids: 1200 mL    Preoperative antibiotic: Ancef (cefazolin) 2 grams    Blood products:   Blood Administration Record (From admission, onward)    None          Pathology:   Order Name Source Comment Collection Info Order Time   BLOOD GAS, ARTERIAL, CORD Umbilical Cord   2021  1:36 PM     Release to patient   Immediate        BLOOD GAS, VENOUS, CORD Umbilical Cord   2021  1:36 PM     Release to patient   Immediate        TISSUE PATHOLOGY EXAM Placenta  Collected By: Kamryn Underwood RN 2021  1:30 PM     Specimen source(s):   Placenta          Release to patient   Immediate            Drains: Salinas catheter to gravity    Complications: None    Condition: Stable to recovery room                                          Infant:                Gender: female  infant    Weight: 2535 g (5 lb 9.4 oz)     Apgars: 8  @ 1 minute /     9  @ 5 minutes    Cord gases: Venous:    pH, Cord Venous   Date Value Ref Range Status   2021 7.344 7.310 - 7.370 pH Units Final         Arterial:    pH, Cord Arterial   Date Value Ref Range Status   2021 7.23 7.22 - 7.30 pH Units Final            Operative Summary:   After obtaining informed consent the patient was taken to the operating room where adequate anesthesia was obtained.  Salinas catheter was placed in the bladder preoperatively.  IV antibiotics were given preoperatively.       The abdomen was prepped and draped in the usual sterile fashion for  delivery.  After confirming adequate anesthesia a Pfannenstiel skin incision was made with the scalpel and carried through to the  underlying layer of fascia.  The fascia was incised in the midline and the incision extended laterally with the Flores scissors and with blunt dissection.       The upper aspect of the fascia was grasped with 2 Kocher clamps, elevated, and dissected off the underlying rectus muscles bluntly and with the Flores scissors.  The Kocher clamps were removed and applied to the inferior aspect of the fascia.  The fascia was dissected off of the rectus muscles in the same fashion.  The peritoneum was entered bluntly.  The incision was stretched and the bladder blade and Forbes retractor inserted for visualization of the uterus.       The uterus was incised with the scalpel in a low transverse fashion.  The uterine incision was entered digitally and the incision extended bluntly in a cranial-caudal fashion.  Retractors were removed and membranes were ruptured.  The infant was delivered atraumatically from vertex presentation   presentation.  The umbilical cord was milked 4 times.  The umbilical cord was clamped and cut and the nose and mouth bulb suctioned.  The infant was handed off to waiting pediatric staff.       Cord blood gases were collected from a clamped segment of umbilical cord.  Cord blood was collected.  The placenta was removed using cord traction and uterine massage.  The uterus was exteriorized and cleared of all clots and debris.  The uterine incision was repaired with #1 Chromic gut in a running locked fashion. A double-layer technique was used.  Additional hemostatic measures required: none.    The incision was inspected and excellent hemostasis was noted.  The tubes and ovaries were noted to be normal.  The appendix was not visualized..  The uterus was returned to the abdomen.  The gutters were cleared of all clots and debris.  Irrigation was used.  The uterine incision was again inspected and found to be hemostatic.       The peritoneum was reapproximated with #1 Chromic gut.  The fascia was closed with 0  "PDS in a running fashion.  The subcutaneous space was reapproximated using 3-0 Plain gut.      The skin was closed with ensorb stapler.  The patient was transferred to the recovery room in stable condition.          Electronically signed by Alessandro Walker DO at 21 1435          Discharge Summary      Roque Isidro III, MD at 21 1107          Discharge Summary    Patient Name: Nichole Ferguson  : 1995  MRN: [id number]  Date of Service: 2021  Referring Provider: Alessandro Walker  Discharge Provider: Roque Rousseau \"Jessica\" KADEN Isidro MD    Date of Admission: 2021    Date of Discharge:  2021         Admission Diagnosis: Pregnancy [Z34.90]  PROM (premature rupture of membranes) [O42.90]   Genital herpes    Discharge Diagnosis: primary  section, low transverse incision at 36w4d    Procedures:  , Low Transverse     2021    1:50 PM      Hospital Course:  Patient is a 26 y.o. female  status post primary  section, low transverse incision without complication at 36w4d. Postpartum course was uncomplicated.  She remained afebrile, with vital signs stable. She was ready for discharge on postpartum day 2.     Infant:   female  fetus 2535 g (5 lb 9.4 oz)  with Apgar scores of 8 , 9  at five minutes.    Discharge Condition: Stable    Discharge to: Home    Discharge Medications:      Discharge Medications      New Medications      Instructions Start Date   acyclovir 400 MG tablet  Commonly known as: ZOVIRAX   400 mg, Oral, 2 Times Daily, Take no more than 5 doses a day.      ibuprofen 600 MG tablet  Commonly known as: ADVIL,MOTRIN   600 mg, Oral, Every 6 Hours      oxyCODONE 5 MG immediate release tablet  Commonly known as: ROXICODONE   5 mg, Oral, Every 4 Hours PRN         Continue These Medications      Instructions Start Date   docusate sodium 100 MG capsule   100 mg, Oral, 2 Times Daily PRN      Prenatal Vitamin 27-0.8 MG tablet   1 tablet, Oral, Daily       " "      Discharge Diet: Regular diet    Discharge Activity: No driving until no longer taking narcotics, Pelvic rest x 6 weeks (nothing in the vagina, no intercourse, tampons, douches) , No lifting >5 lbs for 6 weeks and No strenuous activity     Follow up appointments:Patient is to schedule a follow-up appointment at [facility] for 2 weeks.  She will be seen again in 6 weeks for her final postpartum visit.    Contraception: Patient is instructed to remain at pelvic rest until after her 6-week postpartum check.  She will discuss her contraceptive options at that time.  If she resumes intercourse earlier, she is instructed to rely on condoms for contraception.    Roque Rousseau \"[place]\" KADEN Isidro MD  6/7/2021 11:16 EDT    Electronically signed by Roque Isidro III, MD at 06/07/21 1116       "

## 2021-06-07 NOTE — CASE MANAGEMENT/SOCIAL WORK
Continued Stay Note   Angela     Patient Name: Nichole Ferguson  MRN: [id number]  Today's Date: 6/7/2021    Admit Date: 6/5/2021    Discharge Plan     Row Name 06/07/21 1052       Plan    Plan  MSW available. [facility] Addiction team to see    Plan Comments  Visited pt. Discussed stressors of NICU and offered support. 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 She plans to d/c today. States all of her children live with maternal grandfather. Discussed CPS referral and provided [facility] brochure. Pt states she would like info on suboxone treatment- notified Addiction team. MSW made referral to CPS Intake web ID # [id number]    Final Discharge Disposition Code  01 - home or self-care        Discharge Codes    No documentation.             KANA Christianson

## 2021-06-07 NOTE — CASE MANAGEMENT/SOCIAL WORK
Continued Stay Note   Angela     Patient Name: Nichole Ferguson  MRN: [id number]  Today's Date: 6/7/2021    Admit Date: 6/5/2021    Discharge Plan     Row Name 06/07/21 1224       Plan    Plan  [facility]- Suboxone    Plan Comments  Met with patient prior to discharge- she is interested in pursuing [facility]- Suboxone with [facility].  I called to make her an appointment, but the intake assessor was in a meeting.  We provided her with our comprehensive treatment packet as well a our outreach services.  Once the intake assessor calls me back, I will then call the patient and inform her of her appointment.  KORE Narcan kit provided to patient- pt already aware of how to use it.    Row Name 06/07/21 1057       Plan    Plan  MSW available. [facility] Addiction team to see    Plan Comments  Visited pt. Discussed stressors of NICU and offered support. 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 She plans to d/c today. States all of her children live with maternal grandfather. Discussed CPS referral and provided [facility] brochure. Pt states she would like info on suboxone treatment- notified Addiction team. MSW made referral to CPS Intake web ID # [id number]    Final Discharge Disposition Code  01 - home or self-care        Discharge Codes    No documentation.       Expected Discharge Date and Time     Expected Discharge Date Expected Discharge Time    Jun 7, 2021 11:10 AM            Dee Bae RN ,BSN   Addiction Coordinator

## 2021-06-07 NOTE — PAYOR COMM NOTE
"Nichole Valencia (26 y.o. Female)     [employer] ID#[id number]    Delivery information.    From: Constanza Nelson  #[phone]  Fax#[phone]      Date of Birth Social Security Number Address Home Phone MRN    1995  [address] [place], [place] [id number] [phone] [id number]    Caodaism Marital Status          None Single       Admission Date Admission Type Admitting Provider Attending Provider Department, Room/Bed    6/5/21 Elective Alessandro Walker, Alessandro Rondon,  [facility] MOTHER BABY 4A, N421/1    Discharge Date Discharge Disposition Discharge Destination                       Attending Provider: Alessandro Walker DO    Allergies: No Known Allergies    Isolation: None   Infection: None   Code Status: CPR    Ht: 172.7 cm (68\")   Wt: 68.9 kg (152 lb)    Admission Cmt: None   Principal Problem: None                Active Insurance as of 6/5/2021     Primary Coverage     Payor Plan Insurance Group Employer/Plan Group    [employer] [place] [employer] [place]      Payor Plan Address Payor Plan Phone Number Payor Plan Fax Number Effective Dates    [address] [address] [phone]  5/1/2019 - None Entered    [facility] [id number]       Subscriber Name Subscriber Birth Date Member ID       NICHOLE VALENCIA 1995 [id number]                 Emergency Contacts      (Rel.) Home Phone Work Phone Mobile Phone    CLARISA VIEIRA (Mother) -- -- [phone]            Insurance Information                [facility]/[employer] MEDICAID Phone: [phone]    Subscriber: Nichole Valencia Subscriber#: [id number]    Group#:  Precert#:           Problem List         Codes Noted - Resolved       Hospital    Pregnancy ICD-10-CM: Z34.90  ICD-9-CM: V22.2 6/5/2021 - Present    PROM (premature rupture of membranes) ICD-10-CM: O42.90  ICD-9-CM: 658.10 6/5/2021 - Present       Non-Hospital    Normal labor ICD-10-CM: O80, Z37.9  ICD-9-CM: 650 4/13/2019 - Present    Normal spontaneous vaginal " delivery ICD-10-CM: O80  ICD-9-CM: 650 2017 - Present             History & Physical      Alessandro Walker DO at 21 1159          [facility]  Obstetric History and Physical    Referring Provider: Alessandro Walker DO      Chief Complaint   Patient presents with   • Leaking Fluid       Subjective     Patient is a 26 y.o. female  currently at 36w4d, who presents with C/O leaking of fluid.  Patient reports a gush of clear fluid approximately an hour and a half ago with 2 subsequent episodes.  Patient denies regular urine activity, vaginal bleeding, fever, recent trauma, or either associated symptoms or concerns.  Patient admits to having some vaginal irritation itching approxiweek ago however did not present for evaluation at the onset.  Prenatal care at [facility].  She denies any complications during  course.  Surgical history is significant for 2 prior term and one  vaginal delivery.  She admits to cigarette use and occasional THC        The following portions of the patients history were reviewed and updated as appropriate: current medications, allergies, past medical history, past surgical history, past family history, past social history and problem list .       Prenatal Information:   Maternal Prenatal Labs  Blood Type No results found for: ABO   Rh Status No results found for: RH   Antibody Screen No results found for: ABSCRN   Gonnorhea No results found for: GCCX   Chlamydia No results found for: CLAMYDCU   RPR No results found for: RPR   Syphilis Antibody No results found for: SYPHILIS   Rubella No results found for: RUBELLAIGGIN   Hepatitis B Surface Antigen No results found for: HEPBSAG   HIV-1 Antibody No results found for: LABHIV1   Hepatitis C Antibody No results found for: HEPCAB   Rapid Urin Drug Screen No results found for: AMPMETHU, BARBITSCNUR, LABBENZSCN, LABMETHSCN, LABOPIASCN, THCURSCR, COCAINEUR, AMPHETSCREEN, PROPOXSCN, BUPRENORSCNU,  METAMPSCNUR, OXYCODONESCN, TRICYCLICSCN   Group B Strep Culture No results found for: GBSANTIGEN           External Prenatal Results     Pregnancy Outside Results - Transcribed From Office Records - See Scanned Records For Details     Test Value Date Time    ABO  AB  19    Rh  Positive  19    Antibody Screen  Negative  19    Varicella IgG       Rubella  45.5 IU/mL 17 1147       Immune  17 1147    Hgb  10.7 g/dL 04/15/19 0654    Hct  32.5 % 04/15/19 0654    Glucose Fasting GTT       Glucose Tolerance Test 1 hour       Glucose Tolerance Test 3 hour       Gonorrhea (discrete)       Chlamydia (discrete)       RPR  Non-Reactive  19    VDRL       Syphilis Antibody       HBsAg  Non-Reactive  19    Herpes Simplex Virus PCR       Herpes Simplex VIrus Culture       HIV  Non-Reactive  19    Hep C RNA Quant PCR       Hep C Antibody  Non-Reactive  19    AFP       Group B Strep       GBS Susceptibility to Clindamycin       GBS Susceptibility to Erythromycin       Fetal Fibronectin       Genetic Testing, Maternal Blood             Drug Screening     Test Value Date Time    Urine Drug Screen       Amphetamine Screen  Negative  19 2252    Barbiturate Screen  Negative  19 2252    Benzodiazepine Screen  Negative  19 2252    Methadone Screen  Negative  19    Phencyclidine Screen  Negative  19 225    Opiates Screen  Positive  19 2252    THC Screen  Positive  19    Cocaine Screen       Propoxyphene Screen  Negative  19 2252    Buprenorphine Screen  Negative  19 225    Methamphetamine Screen       Oxycodone Screen  Negative  19 2252    Tricyclic Antidepressants Screen  Negative  19          Legend    ^: Historical                          Past OB History:       OB History    Para Term  AB Living   6 4 2 1 1 4   SAB TAB Ectopic Molar Multiple Live Births      1 0 0 0 0 4      # Outcome Date GA Lbr Vitaly/2nd Weight Sex Delivery Anes PTL Lv   6 Current            5 Term 19 38w1d  2895 g (6 lb 6.1 oz) F Vag-Spont EPI N TERRY      Name: MASON VALENCIA      Apgar1: 8  Apgar5: 9   4 Para 17:14 2220 g (4 lb 14.3 oz) F Vag-Spont Spinal Y TERRY      Name: MASON VALENCIA      Apgar1: 8  Apgar5: 9   3  09/17/15 36w0d  2268 g (5 lb) M Vag-Spont  Y TERRY   2 SAB            1 Term 11 40w0d  3175 g (7 lb) F Vag-Spont  N TERRY       Past Medical History: Past Medical History:   Diagnosis Date   • Asthma    • Chlamydia       Past Surgical History Past Surgical History:   Procedure Laterality Date   • WISDOM TOOTH EXTRACTION        Family History: No family history on file.   Social History:  reports that she has been smoking cigarettes. She has been smoking about 0.50 packs per day. She does not have any smokeless tobacco history on file.   reports no history of alcohol use.   reports current drug use. Drugs: Hydrocodone and Marijuana.                   General ROS Negative Findings:Headaches, Visual Changes, Epigastric pain, Anorexia, Nausia/Vomiting and Vaginal Bleeding    ROS     All other systems have been reviewed and are neg  Objective       Vital Signs Range for the last 24 hours  Temperature: Temp:  [98 °F (36.7 °C)] 98 °F (36.7 °C)   Temp Source: Temp src: Oral   BP: BP: (123)/(73) 123/73   Pulse: Heart Rate:  [96] 96   Respirations: Resp:  [16] 16   SPO2:     O2 Amount (l/min):     O2 Devices     Weight: Weight:  [68.9 kg (152 lb)] 68.9 kg (152 lb)     Physical Examination:   General:   alert, appears stated age and cooperative   Skin:   normal   HEENT:  Sclera clear   Lungs:   clear to auscultation bilaterally   Heart:   regular rate and rhythm, S1, S2 normal, no murmur, click, rub or gallop   Gastrointestinal:  Abdomen soft, gravid uterus, no guarding benign exam   Lower Extremities:  No edema, calf tenderness   : External genitalia:  erosions   Musculoskeletal:   No gross deformities, full range of motion upper lower extremity   Neuro:  No focal deficits,  no clonus     Grossly ruptured  Ulcerated kissing lesions noted over the labia minora majora.  Painful to touch    Presentation: vtx   Cervix: Exam by:     Dilation:     Effacement:     Station:         Fetal Heart Rate Assessment   Method:     Beats/min:     Baseline:     Varibility:     Accels:     Decels:     Tracing Category:       Uterine Assessment   Method:     Frequency (min):     Ctx Count in 10 min:     Duration:     Intensity:     Intensity by IUPC:     Resting Tone:     Resting Tone by IUPC:     [place] Units:       Laboratory Results:   Lab Results (last 24 hours)     Procedure Component Value Units Date/Time    POC Amnisure [[id number]]  (Normal) Collected: 21    Specimen: Amniotic Fluid Updated: 21     POC Amnisure negative        Radiology Review:   Imaging Results (Last 24 Hours)     ** No results found for the last 24 hours. **        Other Studies:    Assessment/Plan       Pregnancy    PROM (premature rupture of membranes)        Assessment:  1.  Intrauterine pregnancy at 36w4d weeks gestation with reactive fetal status.    2.  Premature rupture membranes  3.  Clinical evidence of genital herpes.  Discussed with patient at length my findings and recommendations to proceed with primary  to diminish fetal risk.  Discussed with patient at length methodology procedure of a  including infection, bleeding, need for further surgery, damage to bowel or bladder, and risk of anesthesia.  All questions answered patient agreed.  Informed consent obtained.  4.  Tobacco abuse    Plan:  1.  Admit, labs, prep for primary  due to HSV  2. Plan of care has been reviewed with patient.  3.  Risks, benefits of treatment plan have been discussed.  4.  All questions have been answered.  5      Alessandro Walker DO  2021  11:59  EDT    Electronically signed by Alessandro Walker DO at 06/06/21 1015         Facility-Administered Medications as of 6/7/2021   Medication Dose Route Frequency Provider Last Rate Last Admin   • [COMPLETED] acetaminophen (TYLENOL) tablet 1,000 mg  1,000 mg Oral Once Alessandro Walker DO   1,000 mg at 06/05/21 1317   • [COMPLETED] acetaminophen (TYLENOL) tablet 1,000 mg  1,000 mg Oral Q6H Alessandro Walker DO   1,000 mg at 06/06/21 1442    Followed by   • acetaminophen (TYLENOL) tablet 650 mg  650 mg Oral Q6H Alessandro Walker DO   650 mg at 06/07/21 0706   • aluminum-magnesium hydroxide-simethicone (MAALOX MAX) 400-400-40 MG/5ML suspension 15 mL  15 mL Oral Q4H PRN Alessandro Walker DO        Or   • calcium carbonate (TUMS) chewable tablet 500 mg (200 mg elemental)  1 tablet Oral Q4H PRN Alessandro Walker DO       • [COMPLETED] bupivacaine in dextrose (MARCAINE SPINAL) 0.75-8.25 % injection   Intrathecal  Tom Brito MD   1.6 mL at 06/05/21 1335   • carboprost (HEMABATE) injection 250 mcg  250 mcg Intramuscular PRN Alessandro Walker DO       • [COMPLETED] ceFAZolin in dextrose (ANCEF) 2-4 GM/100ML-% IVPB solution  - ADS Override Pull        2 g at 06/05/21 1317   • diphenhydrAMINE (BENADRYL) capsule 25 mg  25 mg Oral Q4H PRN Alessandro Walker DO       • docusate sodium (COLACE) capsule 100 mg  100 mg Oral BID PRN Alessandro Walker DO   100 mg at 06/06/21 2242   • [COMPLETED] fentaNYL citrate (PF) (SUBLIMAZE) injection   Epidural  Tom Brito MD   15 mcg at 06/05/21 1335   • guaiFENesin (ROBITUSSIN) 100 MG/5ML oral solution 200 mg  200 mg Oral Q4H PRN Alessandro Walker DO   200 mg at 06/07/21 0716   • Hydrocortisone (Perianal) (ANUSOL-HC) 2.5 % rectal cream 1 application  1 application Rectal PRN Alessandro Walker DO       • [COMPLETED] ketorolac (TORADOL) injection 15 mg  15 mg Intravenous Q6H Alessandro Walker DO   15 mg at 06/06/21 1700    Followed by   • ibuprofen (ADVIL,MOTRIN)  tablet 600 mg  600 mg Oral Q6H Alessandro Walker, DO   600 mg at 06/07/21 0509   • [COMPLETED] ketorolac (TORADOL) injection 30 mg  30 mg Intravenous Once Alessandro Walker DO   30 mg at 06/05/21 1454   • [COMPLETED] lactated ringers bolus 1,000 mL  1,000 mL Intravenous Once Alessandro Walker DO 4,000 mL/hr at 06/05/21 1318 1,000 mL at 06/05/21 1318   • lactated ringers infusion  125 mL/hr Intravenous Continuous Alessandro Walker  mL/hr at 06/05/21 1220 125 mL/hr at 06/05/21 1220   • lanolin cream   Topical Q1H PRN Alessandro Walker DO       • lidocaine PF 1% (XYLOCAINE) injection 5 mL  5 mL Intradermal PRN Alessandro Walker, DO       • methylergonovine (METHERGINE) injection 200 mcg  200 mcg Intramuscular Once PRN Alessandro Walker, DO       • methylergonovine (METHERGINE) injection 200 mcg  200 mcg Intramuscular PRN Alessandro Walker DO       • miSOPROStol (CYTOTEC) tablet 800 mcg  800 mcg Rectal PRN Alessandro Walker, DO       • [COMPLETED] morphine PF (DURAMORPH) injection   Epidural  Tom Brito MD   0.15 mg at 06/05/21 1335   • nicotine (NICODERM CQ) 21 MG/24HR patch 1 patch  1 patch Transdermal Q24H Alessandro Walker DO   1 patch at 06/06/21 0909   • oxyCODONE (ROXICODONE) immediate release tablet 5 mg  5 mg Oral Q4H PRN Alessandro Walker DO        Or   • oxyCODONE (ROXICODONE) immediate release tablet 10 mg  10 mg Oral Q4H PRN Alessandro Walker DO   10 mg at 06/07/21 0705   • oxytocin in sodium chloride (PITOCIN) 30 UNIT/500ML infusion solution  650 mL/hr Intravenous Once Alessandro Walker DO   Stopped at 06/05/21 1448    Followed by   • [COMPLETED] oxytocin in sodium chloride (PITOCIN) 30 UNIT/500ML infusion solution  85 mL/hr Intravenous Once Alessandro Walker DO 85 mL/hr at 06/05/21 1454 85 mL/hr at 06/05/21 1454   • prenatal vitamin tablet 1 tablet  1 tablet Oral Daily Alessandro Walker, DO   1 tablet at 06/06/21 0908   • simethicone (MYLICON) chewable tablet 80 mg  80  mg Oral 4x Daily PRN Alessandro Walker DO   80 mg at 06/06/21 2242   • [COMPLETED] Sod Citrate-Citric Acid (BICITRA) solution 30 mL  30 mL Oral Once Alessandro Walker DO   30 mL at 06/05/21 1317   • sodium chloride 0.9 % flush 1-10 mL  1-10 mL Intravenous PRN Alessandro Walker DO       • sodium chloride 0.9 % flush 3 mL  3 mL Intravenous Q12H Alessandro Walker DO   Stopped at 06/05/21 1349   • terbutaline (BRETHINE) injection 0.25 mg  0.25 mg Subcutaneous Once Alessandro Walker DO   Stopped at 06/05/21 1310     Lab Results (last 72 hours)     Procedure Component Value Units Date/Time    Tissue Pathology Exam [[id number]] Collected: 06/05/21 1348    Specimen: Tissue from Placenta Updated: 06/07/21 0634    CBC & Differential [[id number]]  (Abnormal) Collected: 06/06/21 0847    Specimen: Blood Updated: 06/06/21 0920    Narrative:      The following orders were created for panel order CBC & Differential.  Procedure                               Abnormality         Status                     ---------                               -----------         ------                     Scan Slide[[id number]]                                                                  CBC Auto Differential[[id number]]        Abnormal            Final result                 Please view results for these tests on the individual orders.    CBC Auto Differential [[id number]]  (Abnormal) Collected: 06/06/21 0847    Specimen: Blood Updated: 06/06/21 0920     WBC 15.07 10*3/mm3      RBC 3.31 10*6/mm3      Hemoglobin 10.0 g/dL      Hematocrit 35.5 %      .3 fL      MCH 30.2 pg      MCHC 28.2 g/dL      RDW 13.6 %      RDW-SD 53.8 fl      MPV 11.9 fL      Platelets 213 10*3/mm3      Neutrophil % 81.8 %      Lymphocyte % 9.6 %      Monocyte % 7.3 %      Eosinophil % 0.3 %      Basophil % 0.3 %      Immature Grans % 0.7 %      Neutrophils, Absolute 12.32 10*3/mm3      Lymphocytes, Absolute 1.45 10*3/mm3      Monocytes, Absolute 1.10 10*3/mm3       Eosinophils, Absolute 0.04 10*3/mm3      Basophils, Absolute 0.05 10*3/mm3      Immature Grans, Absolute 0.11 10*3/mm3      nRBC 0.0 /100 WBC     HSV 1 Antibody, IgG [[id number]]  (Abnormal) Collected: 06/05/21 1220    Specimen: Blood Updated: 06/06/21 0810     HSV 1 IgG, Type Specific 36.00 index      Comment:                                  Negative        <0.91                                   Equivocal 0.91 - 1.09                                   Positive        >1.09   Note: Negative indicates no antibodies detected to   HSV-1. Equivocal may suggest early infection.  If   clinically appropriate, retest at later date. Positive   indicates antibodies detected to HSV-1.       Narrative:      Performed at:  [address] [place] [address]  [id number]  : Ruben Santana PhD, Phone:  [phone]    HSV 2 Antibody, IgG [[id number]] Collected: 06/05/21 1220    Specimen: Blood Updated: 06/06/21 0810     HSV 2 IgG <0.91 index      Comment:                                  Negative        <0.91                                   Equivocal 0.91 - 1.09                                   Positive        >1.09   Note: Negative indicates no antibodies detected to   HSV-2. Equivocal may suggest early infection.  If   clinically appropriate, retest at later date. Positive   indicates antibodies detected to HSV-2.       Narrative:      Performed at:  [address] [place] [address]  [id number]  : Ruben Santana PhD, Phone:  [phone]    OB Panel With HIV [[id number]]  (Abnormal) Collected: 06/05/21 1221    Specimen: Blood Updated: 06/06/21 0810    Narrative:      The following orders were created for panel order OB Panel With HIV.  Procedure                               Abnormality         Status                     ---------                               -----------         ------                     RPR[[id number]]                                              In  process                 CBC Auto Differential[[id number]]        Abnormal            Final result               Hepatitis B Surface Antigen[[id number]]  Normal              Final result               Rubella Antibody, IgG[[id number]]                            Final result               OB Panel Type & Screen[[id number]]                                                      HIV-1 / O / 2 Ag / Antib...[[id number]]  Normal              Final result               Hepatitis C Antibody[[id number]]         Normal              Final result                 Please view results for these tests on the individual orders.    Rubella Antibody, IgG [[id number]] Collected: 06/05/21 1221    Specimen: Blood Updated: 06/06/21 0810     Rubella Antibodies, IgG 1.49 index      Comment:                                 Non-immune       <0.90                                  Equivocal  0.90 - 0.99                                  Immune           >0.99       Narrative:      Performed at:  [address] [place] [address]  [id number]  : Ruben Santana PhD, Phone:  [phone]    Blood Gas, Venous, Cord [[id number]]  (Abnormal) Collected: 06/05/21 1415    Specimen: Cord Blood Venous from Umbilical Cord Updated: 06/05/21 1415     Site Umbilical     pH, Cord Venous 7.344 pH Units      pCO2, Cord Venous 50.2 mm Hg      pO2, Cord Venous 18.6 mm Hg      HCO3, Cord Venous 27.3 mmol/L      Base Excess, Cord Venous 0.6 mmol/L      O2 Sat, Cord Venous 43.2 %      Hemoglobin, Blood Gas 16.4 g/dL      CO2 Content 28.8 mmol/L      Temperature 37.0 C      Barometric Pressure for Blood Gas --     Comment: N/A        Modality Room Air     FIO2 21 %      Ventilator Mode       Rate 0 Breaths/minute      PIP 0 cmH2O      Comment: Meter: [id number]     :  [id number]        IPAP 0     EPAP 0     O2 Saturation Calculated --     Comment: Calculated O2 saturation result not reported at this site.        Note --     Collection Time  --    Blood Gas, Arterial, Cord [[id number]]  (Abnormal) Collected: 06/05/21 1413    Specimen: Cord Blood Arterial from Umbilical Cord Updated: 06/05/21 1414     Site Umbilical     pH, Cord Arterial 7.23 pH Units      pCO2, Cord Arterial 65.7 mmHg      pO2, Cord Arterial --     Comment: 94 Value below reportable range < 4.0        HCO3, Cord Arterial 27.6 mmol/L      Base Exc, Cord Arterial -2.1 mmol/L      O2 Sat, Cord Arterial 7.0 %      Hemoglobin, Blood Gas 18.0 g/dL      CO2 Content 29.6 mmol/L      Temperature 37.0 C      Barometric Pressure for Blood Gas --     Comment: N/A        Modality Room Air     FIO2 21 %      Rate 0 Breaths/minute      PIP 0 cmH2O      Comment: Meter: [id number]     :  [id number]        IPAP 0     EPAP 0     Note --    Preeclampsia Panel [[id number]]  (Abnormal) Collected: 06/05/21 1221    Specimen: Blood Updated: 06/05/21 1331     Alkaline Phosphatase 166 U/L      ALT (SGPT) 9 U/L      AST (SGOT) 16 U/L      Creatinine 0.49 mg/dL      Total Bilirubin 0.2 mg/dL       U/L      Uric Acid 3.8 mg/dL     HIV-1 / O / 2 Ag / Antibody 4th Generation [[id number]]  (Normal) Collected: 06/05/21 1221    Specimen: Blood Updated: 06/05/21 1319     HIV-1/ HIV-2 Non-Reactive    Narrative:      The HIV antibody/antigen combo assay is a qualitative assay for HIV that includes the p24 antigen as well as antibodies to HIV types 1 and 2. This test is intended to be used as a screening assay in the diagnosis of HIV infection in patients over the age of 2.  Results may be falsely decreased if patient taking Biotin.      Hepatitis B Surface Antigen [[id number]]  (Normal) Collected: 06/05/21 1221    Specimen: Blood Updated: 06/05/21 1311     Hepatitis B Surface Ag Non-Reactive    Narrative:      Results may be falsely decreased if patient taking Biotin.      Hepatitis C Antibody [[id number]]  (Normal) Collected: 06/05/21 1221    Specimen: Blood Updated: 06/05/21 1310     Hepatitis C Ab  Non-Reactive    Narrative:      Results may be falsely decreased if patient taking Biotin.      COVID PRE-OP / PRE-PROCEDURE SCREENING ORDER (NO ISOLATION) - Swab, Nasopharynx [[id number]]  (Normal) Collected: 06/05/21 1219    Specimen: Swab from Nasopharynx Updated: 06/05/21 1257    Narrative:      The following orders were created for panel order COVID PRE-OP / PRE-PROCEDURE SCREENING ORDER (NO ISOLATION) - Swab, Nasopharynx.  Procedure                               Abnormality         Status                     ---------                               -----------         ------                     COVID-19, ABBOTT IN-HOUS...[[id number]]  Normal              Final result                 Please view results for these tests on the individual orders.    COVID-19, ABBOTT IN-HOUSE,NASAL Swab (NO TRANSPORT MEDIA) 2 HR TAT - Swab, Nasopharynx [[id number]]  (Normal) Collected: 06/05/21 1219    Specimen: Swab from Nasopharynx Updated: 06/05/21 1257     COVID19 Presumptive Negative    Narrative:      Fact sheet for providers: https://www.fda.gov/media/563266/download     Fact sheet for patients: https://www.fda.gov/media/282473/download    Test performed by PCR.  If inconsistent with clinical signs and symptoms patient should be tested with different authorized molecular test.    Urine Drug Screen - Urine, Clean Catch [[id number]]  (Abnormal) Collected: 06/05/21 1219    Specimen: Urine, Clean Catch Updated: 06/05/21 1244     THC, Screen, Urine Positive     Phencyclidine (PCP), Urine Negative     Cocaine Screen, Urine Negative     Methamphetamine, Ur Negative     Opiate Screen Negative     Amphetamine Screen, Urine Negative     Benzodiazepine Screen, Urine Negative     Tricyclic Antidepressants Screen Negative     Methadone Screen, Urine Negative     Barbiturates Screen, Urine Negative     Oxycodone Screen, Urine Positive     Propoxyphene Screen Negative     Buprenorphine, Screen, Urine Negative    Narrative:      Cutoff For  Drugs Screened:    Amphetamines               500 ng/ml  Barbiturates               200 ng/ml  Benzodiazepines            150 ng/ml  Cocaine                    150 ng/ml  Methadone                  200 ng/ml  Opiates                    100 ng/ml  Phencyclidine               25 ng/ml  THC                            50 ng/ml  Methamphetamine            500 ng/ml  Tricyclic Antidepressants  300 ng/ml  Oxycodone                  100 ng/ml  Propoxyphene               300 ng/ml  Buprenorphine               10 ng/ml    The normal value for all drugs tested is negative. This report includes unconfirmed screening results, with the cutoff values listed, to be used for medical treatment purposes only.  Unconfirmed results must not be used for non-medical purposes such as employment or legal testing.  Clinical consideration should be applied to any drug of abuse test, particularly when unconfirmed results are used.      Cannabinoid Confirmation, Ur - Urine, Clean Catch [[id number]] Collected: 06/05/21 1219    Specimen: Urine, Clean Catch Updated: 06/05/21 1241    Oxycodone / Morphone Confirmation, Urine - Urine, Clean Catch [[id number]] Collected: 06/05/21 1219    Specimen: Urine, Clean Catch Updated: 06/05/21 1241    CBC Auto Differential [[id number]]  (Abnormal) Collected: 06/05/21 1221    Specimen: Blood Updated: 06/05/21 1241     WBC 17.77 10*3/mm3      RBC 3.88 10*6/mm3      Hemoglobin 11.6 g/dL      Hematocrit 35.7 %      MCV 92.0 fL      MCH 29.9 pg      MCHC 32.5 g/dL      RDW 13.5 %      RDW-SD 45.6 fl      MPV 12.0 fL      Platelets 233 10*3/mm3      Neutrophil % 79.9 %      Lymphocyte % 13.1 %      Monocyte % 6.1 %      Eosinophil % 0.2 %      Basophil % 0.3 %      Immature Grans % 0.4 %      Neutrophils, Absolute 14.18 10*3/mm3      Lymphocytes, Absolute 2.33 10*3/mm3      Monocytes, Absolute 1.09 10*3/mm3      Eosinophils, Absolute 0.04 10*3/mm3      Basophils, Absolute 0.06 10*3/mm3      Immature Grans, Absolute 0.07  10*3/mm3      nRBC 0.0 /100 WBC     Herpes Simplex Virus Culture - Swab, Vagina [[id number]] Collected: 06/05/21 1228    Specimen: Swab from Vagina Updated: 06/05/21 1235    RPR [[id number]] Collected: 06/05/21 1221    Specimen: Blood Updated: 06/05/21 1233    HSV Non-Specific Antibody, IgM [[id number]] Collected: 06/05/21 1220    Specimen: Blood Updated: 06/05/21 1233    POC Amnisure [[id number]]  (Normal) Collected: 06/05/21 1131    Specimen: Amniotic Fluid Updated: 06/05/21 1131     POC Amnisure negative          Orders (last 72 hrs)      Start     Ordered    06/06/21 2300  ibuprofen (ADVIL,MOTRIN) tablet 600 mg  Every 6 Hours      06/05/21 1540    06/06/21 2000  acetaminophen (TYLENOL) tablet 650 mg  Every 6 Hours      06/05/21 1540    06/06/21 1002  guaiFENesin (ROBITUSSIN) 100 MG/5ML oral solution 200 mg  Every 4 Hours PRN      06/06/21 1002    06/06/21 0921  Scan Slide  Once,   Status:  Canceled      06/06/21 0920    06/06/21 0600  Discontinue Indwelling Urinary Catheter in AM  Once      06/05/21 1540    06/06/21 0600  Abdominal Wound Care  Per Order Details     Comments: Postop Day 1. Remove Dressing & Leave Incision Open to Air.    06/05/21 1540    06/06/21 0600  CBC & Differential  Morning Draw      06/05/21 1540    06/06/21 0600  CBC Auto Differential  PROCEDURE ONCE      06/06/21 0005    06/06/21 0000  Remove Abdominal Dressing  Once      06/05/21 1540    06/05/21 2300  ketorolac (TORADOL) injection 15 mg  Every 6 Hours      06/05/21 1540    06/05/21 2015  nicotine (NICODERM CQ) 21 MG/24HR patch 1 patch  Every 24 Hours Scheduled      06/05/21 1926    06/05/21 2000  acetaminophen (TYLENOL) tablet 1,000 mg  Every 6 Hours      06/05/21 1540    06/05/21 1800  Ambulate Patient  2 Times Daily     Comments: After anesthesia wears off.    06/05/21 1540    06/05/21 1630  prenatal vitamin tablet 1 tablet  Daily      06/05/21 1540    06/05/21 1600  Incentive spirometry RT  Every Hour      06/05/21 1540    06/05/21 1600  " Diet Regular  Diet Effective Now      06/05/21 1559    06/05/21 1541  Transfer Patient  Once      06/05/21 1540    06/05/21 1541  Code Status and Medical Interventions:  Continuous      06/05/21 1540    06/05/21 1541  Vital Signs Per Hospital Policy  Per Hospital Policy      06/05/21 1540    06/05/21 1541  Notify Physician  Until Discontinued      06/05/21 1540    06/05/21 1541  Patient May Shower  Per Order Details     Comments: After Anesthesia Wears Off & With Assistance    06/05/21 1540    06/05/21 1541  Advance Diet as Tolerated  Until Discontinued      06/05/21 1540    06/05/21 1541  I/O  Every Shift      06/05/21 1540    06/05/21 1541  Fundal and Lochia Check  Per Order Details     Comments: Every 30 Minutes x2, Every 1 Hour x4, Every 4 Hours x24 Hours, Then Every Shift.    06/05/21 1540    06/05/21 1541  Weigh Patient  Once      06/05/21 1540    06/05/21 1541  Continue Indwelling Urinary Catheter Already in Place  Once      06/05/21 1540    06/05/21 1541  Notify Provider if Bladder Distention Continues  Until Discontinued      06/05/21 1540    06/05/21 1541  Urinary Catheter Care  Every Shift,   Status:  Canceled      06/05/21 1540    06/05/21 1541  Turn Cough Deep Breathe  Once      06/05/21 1540    06/05/21 1541  Encourage early intake of PO fluids  Continuous      06/05/21 1540    06/05/21 1541  Ambulate Patient 3-5 times per day (with or without Salinas)  Every Shift      06/05/21 1540    06/05/21 1541  Apply Abdominal Binding Until Discontinued  Until Discontinued      06/05/21 1540    06/05/21 1541  \"If patient tolerates food and liquids after completion of second bag of Pitocin, saline lock IV and discontinue IV fluid infusions.  Once      06/05/21 1540    06/05/21 1541  Breast pump to bed  Once      06/05/21 1540    06/05/21 1541  If indicated -- Please administer RH Immunoglobulin based on results of cord blood evaluation and fetal screen lab tests, pharmacy to dispense  Per Order Details     "   Comments: See Process Instructions For Reference Range Details.    06/05/21 1540    06/05/21 1541  Place Sequential Compression Device  Once      06/05/21 1540    06/05/21 1541  Maintain Sequential Compression Device  Continuous      06/05/21 1540    06/05/21 1540  docusate sodium (COLACE) capsule 100 mg  2 Times Daily PRN      06/05/21 1540    06/05/21 1540  docusate sodium (COLACE) capsule 100 mg  2 Times Daily PRN,   Status:  Discontinued      06/05/21 1540    06/05/21 1540  simethicone (MYLICON) chewable tablet 80 mg  4 Times Daily PRN      06/05/21 1540    06/05/21 1540  lanolin cream  Every 1 Hour PRN      06/05/21 1540    06/05/21 1540  methylergonovine (METHERGINE) injection 200 mcg  As Needed      06/05/21 1540    06/05/21 1540  Hydrocortisone (Perianal) (ANUSOL-HC) 2.5 % rectal cream 1 application  As Needed      06/05/21 1540    06/05/21 1540  diphenhydrAMINE (BENADRYL) capsule 25 mg  Every 4 Hours PRN      06/05/21 1540    06/05/21 1540  aluminum-magnesium hydroxide-simethicone (MAALOX MAX) 400-400-40 MG/5ML suspension 15 mL  Every 4 Hours PRN      06/05/21 1540    06/05/21 1540  calcium carbonate (TUMS) chewable tablet 500 mg (200 mg elemental)  Every 4 Hours PRN      06/05/21 1540    06/05/21 1540  oxyCODONE (ROXICODONE) immediate release tablet 5 mg  Every 4 Hours PRN      06/05/21 1540    06/05/21 1540  oxyCODONE (ROXICODONE) immediate release tablet 10 mg  Every 4 Hours PRN      06/05/21 1540    06/05/21 1416  Blood Gas, Venous, Cord  Once      06/05/21 1415    06/05/21 1415  Blood Gas, Arterial, Cord  Once      06/05/21 1413    06/05/21 1400  terbutaline (BRETHINE) injection 0.25 mg  Once      06/05/21 1311    06/05/21 1336  Blood Gas, Arterial, Cord  Once      06/05/21 1336    06/05/21 1336  Blood Gas, Venous, Cord  Once      06/05/21 1336    06/05/21 1331  Tissue Pathology Exam  Once      06/05/21 1330    06/05/21 1255  NPO Diet NPO Except: Ice chips  Diet Effective Now,   Status:  Canceled       06/05/21 1254    06/05/21 1253  Case Management  Consult  Once     Provider:  (Not yet assigned)    06/05/21 1254    06/05/21 1245  sodium chloride 0.9 % flush 3 mL  Every 12 Hours Scheduled      06/05/21 1159    06/05/21 1245  lactated ringers bolus 1,000 mL  Once      06/05/21 1159    06/05/21 1245  lactated ringers infusion  Continuous      06/05/21 1159    06/05/21 1245  Sod Citrate-Citric Acid (BICITRA) solution 30 mL  Once      06/05/21 1159    06/05/21 1245  acetaminophen (TYLENOL) tablet 1,000 mg  Once      06/05/21 1159    06/05/21 1245  oxytocin in sodium chloride (PITOCIN) 30 UNIT/500ML infusion solution  Once      06/05/21 1159    06/05/21 1245  oxytocin in sodium chloride (PITOCIN) 30 UNIT/500ML infusion solution  Once      06/05/21 1159    06/05/21 1245  ketorolac (TORADOL) injection 30 mg  Once      06/05/21 1159    06/05/21 1242  Cannabinoid Confirmation, Ur - Urine, Clean Catch  Once      06/05/21 1241    06/05/21 1242  Oxycodone / Morphone Confirmation, Urine - Urine, Clean Catch  Once      06/05/21 1241    06/05/21 1209  HSV 2 Antibody, IgG  Once      06/05/21 1209    06/05/21 1209  HSV 1 Antibody, IgG  Once      06/05/21 1209    06/05/21 1209  HSV Non-Specific Antibody, IgM  Once      06/05/21 1209    06/05/21 1209  Herpes Simplex Virus Culture - Swab, Vagina  Once      06/05/21 1209    06/05/21 1159  OB Panel With HIV  STAT      06/05/21 1159    06/05/21 1159  Hepatitis C Antibody  Once,   Status:  Canceled      06/05/21 1159    06/05/21 1159  COVID PRE-OP / PRE-PROCEDURE SCREENING ORDER (NO ISOLATION) - Swab, Nasopharynx  Once      06/05/21 1159    06/05/21 1159  Urine Drug Screen - Urine, Clean Catch  Once      06/05/21 1159    06/05/21 1159  RPR  PROCEDURE ONCE      06/05/21 1159    06/05/21 1159  CBC Auto Differential  PROCEDURE ONCE      06/05/21 1159    06/05/21 1159  Hepatitis B Surface Antigen  PROCEDURE ONCE      06/05/21 1159    06/05/21 1159  Rubella Antibody, IgG   PROCEDURE ONCE      21 1159    21 1159  OB Panel Type & Screen  PROCEDURE ONCE,   Status:  Canceled      21 1159    21 1159  HIV-1 / O / 2 Ag / Antibody 4th Generation  PROCEDURE ONCE      21 1159    21 1159  Hepatitis C Antibody  PROCEDURE ONCE      21 1159    21 1159  COVID-19, ABBOTT IN-HOUSE,NASAL Swab (NO TRANSPORT MEDIA) 2 HR TAT - Swab, Nasopharynx  PROCEDURE ONCE      21 1159    21 1158  ceFAZolin in dextrose (ANCEF) 2-4 GM/100ML-% IVPB solution  - ADS Override Pull     Note to Pharmacy: Created by cabinet override    21 1158    21 1157  Admit To Obstetrics Inpatient  Once      21 1159    21 1157  Code Status and Medical Interventions:  Continuous,   Status:  Canceled      21 1159    21 1157  Obtain informed consent  Once      21 1159    21 1157  Vital Signs Per Hospital Policy  Per Hospital Policy      21 1159    21 1157  Continuous Fetal Monitoring With NST on Admission and Prior to Initiation of Oxytocin.  Per Order Details     Comments: Continuous Fetal Monitoring With NST on Admission & Prior to Initiation of Oxytocin.    21 1159    21 1157  External Uterine Contraction Monitoring  Per Hospital Policy      21 1159    21 1157  Notify Physician (specified)  Until Discontinued      21 1159    21 1157  Notify physician for tachysystole (per hospital algorithm)  Until Discontinued      21 1159    21 1157  Notify physician if membranes ruptured, bleeding greater than 1 pad an hour, fetal heart tone abnormality, and severe pain  Until Discontinued      21 1159    21 1157  Initiate Group Beta Strep (GBS) Prophylaxis Protocol, If Criteria Met  Continuous     Comments: NO TREATMENT RECOMMENDED IF: 1)  Maternal GBS status known negative 2)  Scheduled  birth with intact membranes, not in labor.  3 ) Maternal GBS unknown, no risk  factors.   TREAT WITH ANTIBIOTICS IF:  1)  Maternal GBS status is known postive.  2)  Maternal GBS status unknown with these risk factors:  a)  Previous infant affected by GBS infection.  b)  GBS urinary tract infection (UTI) or bacteruria during pregnancy  c)  Unexplained maternal fever in labor (greater than or equal to 100.4F or 38.0C)  d)  Prolonged rupture of the membranes greater than or equal to 18 hours.  e)  Gestational age less than 37 weeks.    06/05/21 1159    06/05/21 1157  Insert Indwelling Urinary Catheter  Once,   Status:  Canceled      06/05/21 1159    06/05/21 1157  Assess Need for Indwelling Urinary Catheter - Follow Removal Protocol  Continuous,   Status:  Canceled     Comments: Indwelling Urinary Catheter Removal Criteria  Discontinue Indwelling Urinary Catheter Unless One of the Following is Present  Urinary Retention or Obstruction  Chronic Salinas Catheter Use  End of Life  Critical Illness with Strict I/O   Tract or Abdominal Surgery  Stage 3/4 Sacral / Perineal Wound  Required Activity Restriction: Trauma  Required Activity Restriction: Spine Surgery  If Patient is Being Followed by Urology Contact Them PRIOR to Removal  Do Not Remove Indwelling Urinary Catheter Order is Present with a CLINICAL REASON to Maintain the Catheter. Provider is Required to Include a Clinical Reason to Maintain a Urinary Catheter    Chronic Salinas Catheter Use (Present on Admission)  Assess for Continued Need & Document Medical Necessity  If Infection is Suspected, Contact the Provider        06/05/21 1159    06/05/21 1157  Urinary Catheter Care  Every Shift,   Status:  Canceled      06/05/21 1159    06/05/21 1157  Abdominal Prep with Clippers  Once      06/05/21 1159    06/05/21 1157  Chlorhexadine Skin Prep Unless Otherwise Indicated  Once      06/05/21 1159    06/05/21 1157  SCD (sequential compression devices)  Once      06/05/21 1159    06/05/21 1157  POC Glucose Once  Once      06/05/21 1159    06/05/21 1157   Document Gatorade Consumption Prior to Admission (Yes or No)  Once      06/05/21 1159    06/05/21 1157  NPO Diet  Diet Effective Now,   Status:  Canceled      06/05/21 1159    06/05/21 1157  NPO Diet NPO Except: Ice chips  Diet Effective Now,   Status:  Canceled      06/05/21 1159    06/05/21 1157  Inpatient Consult to Anesthesiology  Once     Specialty:  Anesthesiology  Provider:  Tom Brito MD    06/05/21 1159    06/05/21 1157  Type & Screen  Once      06/05/21 1159    06/05/21 1157  CBC (No Diff)  STAT,   Status:  Canceled      06/05/21 1159    06/05/21 1157  Preeclampsia Panel  Once      06/05/21 1159    06/05/21 1157  Insert Peripheral IV  Once      06/05/21 1159    06/05/21 1157  Saline Lock & Maintain IV Access  Continuous      06/05/21 1159    06/05/21 1157  Notify Physician (specified)  Until Discontinued      06/05/21 1159    06/05/21 1157  Vital Signs Per Hospital Policy  Per Hospital Policy      06/05/21 1159    06/05/21 1157  Strict Bed Rest  Until Discontinued      06/05/21 1159    06/05/21 1157  Fundal & Lochia Check  Per Order Details     Comments: Every 15 Minutes x4, Then Every 30 Minutes x2, Then Every Shift    06/05/21 1159    06/05/21 1157  Fundal & Lochia Check  Every Shift      06/05/21 1159    06/05/21 1157  Diet Regular  Diet Effective Now,   Status:  Canceled      06/05/21 1159    06/05/21 1156  methylergonovine (METHERGINE) injection 200 mcg  Once As Needed      06/05/21 1159    06/05/21 1156  carboprost (HEMABATE) injection 250 mcg  As Needed      06/05/21 1159    06/05/21 1156  miSOPROStol (CYTOTEC) tablet 800 mcg  As Needed      06/05/21 1159    06/05/21 1156  lidocaine PF 1% (XYLOCAINE) injection 5 mL  As Needed      06/05/21 1159    06/05/21 1156  sodium chloride 0.9 % flush 1-10 mL  As Needed      06/05/21 1159    06/05/21 1116  POC Amnisure  Once      06/05/21 1115    06/05/21 1116  Fetal Nonstress Test  Once     Comments: Patient presents with:  Leaking Fluid      06/05/21  1115    21 1116  Initiate Observation Status  Once      21 1116    Unscheduled  Up with Assistance  As Needed      21 1540    Unscheduled  Bladder Scan if Patient Unable to Void 4-6 Hours After Catheter Removal  As Needed      21 1540    Unscheduled  Straight Cath Every 4-6 Hours As Needed If Patient is Unable to Void After 4-6 Hours, Bladder Scan Volume is Greater Than 500mL & Patient Has Symptoms of Bladder Discomfort / Distention  As Needed      21 1540    Unscheduled  Consult Pharmacist For Review of Medications That May Cause Urinary Retention - RN To Place Order for Consult it Needed  As Needed      21 1540    Unscheduled  Schedule / Prompt Voiding For Patients With Urinary Incontinence  As Needed      21 1540    Unscheduled  Chewing Gum  As Needed      21 1540    Unscheduled  Warm compress  As Needed      21 1540    Unscheduled  Apply ace wrap, tight bra, or binder  As Needed      21 1540    Unscheduled  Apply ice packs  As Needed      21 1540                   Operative/Procedure Notes (last 72 hours) (Notes from 21 0720 through 21 0720)      Alessandro Walker DO at 21 1431          Operative Note    Patient name: Nichole Ferguson  YOB: 1995   MRN: [id number]  Admission Date: 2021  Referring Provider: Alessandro Walker DO    ID: 26 y.o.  at 36w4d    Preoperative Diagnosis:   Patient Active Problem List   Diagnosis   • Normal spontaneous vaginal delivery   • Normal labor   • Pregnancy   • PROM (premature rupture of membranes)   Intrauterine pregnancy at 36 weeks 4 days gestation  Premature rupture membranes  Suspected recurrent genital HSV    Postoperative Diagnosis: Same as above.  Same   Meconium stained amniotic fluid    Procedure(s): primarylow transverse  delivery (2 layer closure)    Surgeons: Surgeon(s) and Role:     * Alessandro Walker DO - Primary    Anesthesia: Spinal    Estimated  Blood Loss: 350ml mL    IV Fluids: 1200 mL    Preoperative antibiotic: Ancef (cefazolin) 2 grams    Blood products:   Blood Administration Record (From admission, onward)    None          Pathology:   Order Name Source Comment Collection Info Order Time   BLOOD GAS, ARTERIAL, CORD Umbilical Cord   2021  1:36 PM     Release to patient   Immediate        BLOOD GAS, VENOUS, CORD Umbilical Cord   2021  1:36 PM     Release to patient   Immediate        TISSUE PATHOLOGY EXAM Placenta  Collected By: Kamryn Underwood RN 2021  1:30 PM     Specimen source(s):   Placenta          Release to patient   Immediate            Drains: Salinas catheter to gravity    Complications: None    Condition: Stable to recovery room                                          Infant:                Gender: female  infant    Weight: 2535 g (5 lb 9.4 oz)     Apgars: 8  @ 1 minute /     9  @ 5 minutes    Cord gases: Venous:    pH, Cord Venous   Date Value Ref Range Status   2021 7.344 7.310 - 7.370 pH Units Final         Arterial:    pH, Cord Arterial   Date Value Ref Range Status   2021 7.23 7.22 - 7.30 pH Units Final            Operative Summary:   After obtaining informed consent the patient was taken to the operating room where adequate anesthesia was obtained.  Salinas catheter was placed in the bladder preoperatively.  IV antibiotics were given preoperatively.       The abdomen was prepped and draped in the usual sterile fashion for  delivery.  After confirming adequate anesthesia a Pfannenstiel skin incision was made with the scalpel and carried through to the underlying layer of fascia.  The fascia was incised in the midline and the incision extended laterally with the Flores scissors and with blunt dissection.       The upper aspect of the fascia was grasped with 2 Kocher clamps, elevated, and dissected off the underlying rectus muscles bluntly and with the Flores scissors.  The Kocher clamps were removed and  applied to the inferior aspect of the fascia.  The fascia was dissected off of the rectus muscles in the same fashion.  The peritoneum was entered bluntly.  The incision was stretched and the bladder blade and Forbes retractor inserted for visualization of the uterus.       The uterus was incised with the scalpel in a low transverse fashion.  The uterine incision was entered digitally and the incision extended bluntly in a cranial-caudal fashion.  Retractors were removed and membranes were ruptured.  The infant was delivered atraumatically from vertex presentation   presentation.  The umbilical cord was milked 4 times.  The umbilical cord was clamped and cut and the nose and mouth bulb suctioned.  The infant was handed off to waiting pediatric staff.       Cord blood gases were collected from a clamped segment of umbilical cord.  Cord blood was collected.  The placenta was removed using cord traction and uterine massage.  The uterus was exteriorized and cleared of all clots and debris.  The uterine incision was repaired with #1 Chromic gut in a running locked fashion. A double-layer technique was used.  Additional hemostatic measures required: none.    The incision was inspected and excellent hemostasis was noted.  The tubes and ovaries were noted to be normal.  The appendix was not visualized..  The uterus was returned to the abdomen.  The gutters were cleared of all clots and debris.  Irrigation was used.  The uterine incision was again inspected and found to be hemostatic.       The peritoneum was reapproximated with #1 Chromic gut.  The fascia was closed with 0 PDS in a running fashion.  The subcutaneous space was reapproximated using 3-0 Plain gut.      The skin was closed with ensorb stapler.  The patient was transferred to the recovery room in stable condition.          Electronically signed by Alessandro Walker DO at 06/05/21 9631          Physician Progress Notes (last 72 hours) (Notes from 06/04/21  0720 through 21 0720)      Alessandro Walker DO at 21 1008              Postpartum Progress Note    Patient name: Nichole Ferguson  YOB: 1995   MRN: [id number]  Referring Provider: Alessandro Walker DO  Admission Date: 2021  Date of Service: 2021    ID: 26 y.o.     Diagnosis:   S/p  delivery 1 Day Post-Op   Patient Active Problem List   Diagnosis   • Normal spontaneous vaginal delivery   • Normal labor   • Pregnancy   • PROM (premature rupture of membranes)       Subjective:      No complaints.  Light lochia.  No flatus  Ambulating, voiding, tolerating diet.  Pain well controlled.  It is unknown whether the patient is currently breastfeeding.   This baby is in room    Objective:      Vital signs:  Vital Signs Range for the last 24 hours  Temperature: Temp:  [97.5 °F (36.4 °C)-98.7 °F (37.1 °C)] 98.1 °F (36.7 °C)   Temp Source: Temp src: Oral   BP: BP: ()/(47-73) 115/65   Pulse: Heart Rate:  [67-99] 83   Respirations: Resp:  [16] 16   Weight: 68.9 kg (152 lb)     General: Alert & oriented x4, in no apparent distress  Abdomen: soft, nontender  Uterus: firm, nontender  Incision: clean, dry, intact, dressing clean, staples  Extremities: nontender; no edema      Labs:  Lab Results   Component Value Date    WBC 15.07 (H) 2021    HGB 10.0 (L) 2021    HCT 35.5 2021    .3 (H) 2021     2021     Results from last 7 days   Lab Units 21  1221   ABO TYPING  AB   RH TYPING  Positive     External Prenatal Results     Pregnancy Outside Results - Transcribed From Office Records - See Scanned Records For Details     Test Value Date Time    ABO  AB  21 1221    Rh  Positive  21 1221    Antibody Screen  Negative  21 1221    Varicella IgG       Rubella  1.49 index 21 1221    Hgb  10.0 g/dL 21 0847       11.6 g/dL 21 1221    Hct  35.5 % 21 0847       35.7 % 21 1221    Glucose Fasting GTT        Glucose Tolerance Test 1 hour       Glucose Tolerance Test 3 hour       Gonorrhea (discrete)       Chlamydia (discrete)       RPR  Non-Reactive  19 2110    VDRL       Syphilis Antibody       HBsAg  Non-Reactive  21 1221    Herpes Simplex Virus PCR       Herpes Simplex VIrus Culture       HIV  Non-Reactive  21 1221    Hep C RNA Quant PCR       Hep C Antibody  Non-Reactive  21 1221    AFP       Group B Strep       GBS Susceptibility to Clindamycin       GBS Susceptibility to Erythromycin       Fetal Fibronectin       Genetic Testing, Maternal Blood             Drug Screening     Test Value Date Time    Urine Drug Screen       Amphetamine Screen  Negative  21 1219    Barbiturate Screen  Negative  21 1219    Benzodiazepine Screen  Negative  21 1219    Methadone Screen  Negative  21 1219    Phencyclidine Screen  Negative  21 1219    Opiates Screen  Negative  21 1219    THC Screen  Positive  21 1219    Cocaine Screen       Propoxyphene Screen  Negative  21 1219    Buprenorphine Screen  Negative  21 1219    Methamphetamine Screen       Oxycodone Screen  Positive  21 1219    Tricyclic Antidepressants Screen  Negative  21 1219          Legend    ^: Historical                        Assessment/Plan:      1 Day Post-Op s/p Procedure(s):   SECTION PRIMARY  1. S/p primary low transverse  delivery due to suspected genital herpes premature rupture membranes - continue postoperative care.  Doing well.  2.  Limited prenatal care ([facility]  clinic  3. suspected recurrent genital herpes  4.  Mild anemia postpartum hemoglobin stable at 10  5.  History of substance abuse  6.  Tobacco abuse  PLAN  1.  Advance diet  2.  Encourage ambulation and incentive spirometer use  3.  Anticipate full recovery  4.   evaluation pending   Questions were answered.          Electronically signed by Denise  Alessandro ULLOA DO at 06/06/21 1014

## 2021-06-07 NOTE — DISCHARGE SUMMARY
"Discharge Summary    Patient Name: Nichole Ferguson  : 1995  MRN: [id number]  Date of Service: 2021  Referring Provider: Alessandro Walker  Discharge Provider: Roque Rousseau \"Jessica\"KADEN Isidro MD    Date of Admission: 2021    Date of Discharge:  2021         Admission Diagnosis: Pregnancy [Z34.90]  PROM (premature rupture of membranes) [O42.90]   Genital herpes    Discharge Diagnosis: primary  section, low transverse incision at 36w4d    Procedures:  , Low Transverse     2021    1:50 PM      Hospital Course:  Patient is a 26 y.o. female  status post primary  section, low transverse incision without complication at 36w4d. Postpartum course was uncomplicated.  She remained afebrile, with vital signs stable. She was ready for discharge on postpartum day 2.     Infant:   female  fetus 2535 g (5 lb 9.4 oz)  with Apgar scores of 8 , 9  at five minutes.    Discharge Condition: Stable    Discharge to: Home    Discharge Medications:      Discharge Medications      New Medications      Instructions Start Date   acyclovir 400 MG tablet  Commonly known as: ZOVIRAX   400 mg, Oral, 2 Times Daily, Take no more than 5 doses a day.      ibuprofen 600 MG tablet  Commonly known as: ADVIL,MOTRIN   600 mg, Oral, Every 6 Hours      oxyCODONE 5 MG immediate release tablet  Commonly known as: ROXICODONE   5 mg, Oral, Every 4 Hours PRN         Continue These Medications      Instructions Start Date   docusate sodium 100 MG capsule   100 mg, Oral, 2 Times Daily PRN      Prenatal Vitamin 27-0.8 MG tablet   1 tablet, Oral, Daily             Discharge Diet: Regular diet    Discharge Activity: No driving until no longer taking narcotics, Pelvic rest x 6 weeks (nothing in the vagina, no intercourse, tampons, douches) , No lifting >5 lbs for 6 weeks and No strenuous activity     Follow up appointments:Patient is to schedule a follow-up appointment at [facility] for 2 weeks.  She will be seen " "again in 6 weeks for her final postpartum visit.    Contraception: Patient is instructed to remain at pelvic rest until after her 6-week postpartum check.  She will discuss her contraceptive options at that time.  If she resumes intercourse earlier, she is instructed to rely on condoms for contraception.    Roque Rousseau \"Jessica\" KADEN Isidro MD  6/7/2021 11:16 EDT  "

## 2021-06-08 LAB
CYTO UR: NORMAL
HSV SPEC CULT: NEGATIVE
LAB AP CASE REPORT: NORMAL
LAB AP CLINICAL INFORMATION: NORMAL
PATH REPORT.FINAL DX SPEC: NORMAL
PATH REPORT.GROSS SPEC: NORMAL

## 2021-06-09 LAB — TREPONEMA PALLIDUM IGG+IGM AB [PRESENCE] IN SERUM OR PLASMA BY IMMUNOASSAY: REACTIVE

## 2021-06-14 LAB — REF LAB TEST METHOD: NORMAL

## 2021-06-15 LAB — REF LAB TEST METHOD: NORMAL

## 2024-09-25 ENCOUNTER — HOSPITAL ENCOUNTER (EMERGENCY)
Facility: HOSPITAL | Age: 29
Discharge: HOME | End: 2024-09-25
Attending: EMERGENCY MEDICINE
Payer: MEDICAID

## 2024-09-25 ENCOUNTER — APPOINTMENT (OUTPATIENT)
Dept: ULTRASOUND IMAGING | Facility: HOSPITAL | Age: 29
End: 2024-09-25
Payer: MEDICAID

## 2024-09-25 VITALS
DIASTOLIC BLOOD PRESSURE: 59 MMHG | BODY MASS INDEX: 21.52 KG/M2 | SYSTOLIC BLOOD PRESSURE: 100 MMHG | TEMPERATURE: 98.4 F | HEIGHT: 68 IN | HEART RATE: 88 BPM | WEIGHT: 142 LBS | OXYGEN SATURATION: 95 % | RESPIRATION RATE: 18 BRPM

## 2024-09-25 DIAGNOSIS — O20.9 VAGINAL BLEEDING AFFECTING EARLY PREGNANCY: Primary | ICD-10-CM

## 2024-09-25 DIAGNOSIS — R10.2 PELVIC PAIN DURING PREGNANCY: ICD-10-CM

## 2024-09-25 DIAGNOSIS — O26.899 PELVIC PAIN DURING PREGNANCY: ICD-10-CM

## 2024-09-25 LAB
ANION GAP SERPL CALCULATED.3IONS-SCNC: 10 MMOL/L (ref 5–15)
B-HCG UR QL: POSITIVE
BACTERIA UR QL AUTO: ABNORMAL /HPF
BASOPHILS # BLD AUTO: 0.05 10*3/MM3 (ref 0–0.2)
BASOPHILS NFR BLD AUTO: 0.5 % (ref 0–1.5)
BILIRUB UR QL STRIP: NEGATIVE
BUN SERPL-MCNC: 16 MG/DL (ref 6–20)
BUN/CREAT SERPL: 20.8 (ref 7–25)
CALCIUM SPEC-SCNC: 9 MG/DL (ref 8.6–10.5)
CHLORIDE SERPL-SCNC: 99 MMOL/L (ref 98–107)
CLARITY UR: ABNORMAL
CO2 SERPL-SCNC: 26 MMOL/L (ref 22–29)
COLOR UR: ABNORMAL
CREAT SERPL-MCNC: 0.77 MG/DL (ref 0.57–1)
DEPRECATED RDW RBC AUTO: 42.3 FL (ref 37–54)
EGFRCR SERPLBLD CKD-EPI 2021: 105.9 ML/MIN/1.73
EOSINOPHIL # BLD AUTO: 0.2 10*3/MM3 (ref 0–0.4)
EOSINOPHIL NFR BLD AUTO: 2 % (ref 0.3–6.2)
ERYTHROCYTE [DISTWIDTH] IN BLOOD BY AUTOMATED COUNT: 13 % (ref 12.3–15.4)
EXPIRATION DATE: ABNORMAL
GLUCOSE SERPL-MCNC: 90 MG/DL (ref 65–99)
GLUCOSE UR STRIP-MCNC: NEGATIVE MG/DL
HCG INTACT+B SERPL-ACNC: 2590 MIU/ML
HCT VFR BLD AUTO: 38.2 % (ref 34–46.6)
HGB BLD-MCNC: 12.9 G/DL (ref 12–15.9)
HGB UR QL STRIP.AUTO: ABNORMAL
HOLD SPECIMEN: NORMAL
HYALINE CASTS UR QL AUTO: ABNORMAL /LPF
IMM GRANULOCYTES # BLD AUTO: 0.03 10*3/MM3 (ref 0–0.05)
IMM GRANULOCYTES NFR BLD AUTO: 0.3 % (ref 0–0.5)
INTERNAL NEGATIVE CONTROL: NEGATIVE
INTERNAL POSITIVE CONTROL: POSITIVE
KETONES UR QL STRIP: NEGATIVE
LEUKOCYTE ESTERASE UR QL STRIP.AUTO: ABNORMAL
LYMPHOCYTES # BLD AUTO: 3.72 10*3/MM3 (ref 0.7–3.1)
LYMPHOCYTES NFR BLD AUTO: 36.8 % (ref 19.6–45.3)
Lab: ABNORMAL
MCH RBC QN AUTO: 30.1 PG (ref 26.6–33)
MCHC RBC AUTO-ENTMCNC: 33.8 G/DL (ref 31.5–35.7)
MCV RBC AUTO: 89.3 FL (ref 79–97)
MONOCYTES # BLD AUTO: 0.91 10*3/MM3 (ref 0.1–0.9)
MONOCYTES NFR BLD AUTO: 9 % (ref 5–12)
NEUTROPHILS NFR BLD AUTO: 5.21 10*3/MM3 (ref 1.7–7)
NEUTROPHILS NFR BLD AUTO: 51.4 % (ref 42.7–76)
NITRITE UR QL STRIP: NEGATIVE
NRBC BLD AUTO-RTO: 0 /100 WBC (ref 0–0.2)
PH UR STRIP.AUTO: 5.5 [PH] (ref 5–8)
PLATELET # BLD AUTO: 244 10*3/MM3 (ref 140–450)
PMV BLD AUTO: 11.1 FL (ref 6–12)
POTASSIUM SERPL-SCNC: 3.8 MMOL/L (ref 3.5–5.2)
PROT UR QL STRIP: ABNORMAL
RBC # BLD AUTO: 4.28 10*6/MM3 (ref 3.77–5.28)
RBC # UR STRIP: ABNORMAL /HPF
REF LAB TEST METHOD: ABNORMAL
SODIUM SERPL-SCNC: 135 MMOL/L (ref 136–145)
SP GR UR STRIP: >=1.03 (ref 1–1.03)
SQUAMOUS #/AREA URNS HPF: ABNORMAL /HPF
UROBILINOGEN UR QL STRIP: ABNORMAL
WBC # UR STRIP: ABNORMAL /HPF
WBC NRBC COR # BLD AUTO: 10.12 10*3/MM3 (ref 3.4–10.8)
WHOLE BLOOD HOLD COAG: NORMAL
WHOLE BLOOD HOLD SPECIMEN: NORMAL

## 2024-09-25 PROCEDURE — 96375 TX/PRO/DX INJ NEW DRUG ADDON: CPT

## 2024-09-25 PROCEDURE — 81001 URINALYSIS AUTO W/SCOPE: CPT | Performed by: EMERGENCY MEDICINE

## 2024-09-25 PROCEDURE — 81025 URINE PREGNANCY TEST: CPT | Performed by: EMERGENCY MEDICINE

## 2024-09-25 PROCEDURE — 25010000002 METOCLOPRAMIDE PER 10 MG: Performed by: EMERGENCY MEDICINE

## 2024-09-25 PROCEDURE — 84702 CHORIONIC GONADOTROPIN TEST: CPT | Performed by: EMERGENCY MEDICINE

## 2024-09-25 PROCEDURE — 25810000003 SODIUM CHLORIDE 0.9 % SOLUTION: Performed by: EMERGENCY MEDICINE

## 2024-09-25 PROCEDURE — 96374 THER/PROPH/DIAG INJ IV PUSH: CPT

## 2024-09-25 PROCEDURE — 80048 BASIC METABOLIC PNL TOTAL CA: CPT | Performed by: EMERGENCY MEDICINE

## 2024-09-25 PROCEDURE — 99284 EMERGENCY DEPT VISIT MOD MDM: CPT

## 2024-09-25 PROCEDURE — 85025 COMPLETE CBC W/AUTO DIFF WBC: CPT | Performed by: EMERGENCY MEDICINE

## 2024-09-25 PROCEDURE — 76817 TRANSVAGINAL US OBSTETRIC: CPT

## 2024-09-25 PROCEDURE — 25010000002 MORPHINE PER 10 MG: Performed by: EMERGENCY MEDICINE

## 2024-09-25 RX ORDER — METOCLOPRAMIDE HYDROCHLORIDE 5 MG/ML
10 INJECTION INTRAMUSCULAR; INTRAVENOUS ONCE
Status: COMPLETED | OUTPATIENT
Start: 2024-09-25 | End: 2024-09-25

## 2024-09-25 RX ORDER — SODIUM CHLORIDE 0.9 % (FLUSH) 0.9 %
10 SYRINGE (ML) INJECTION AS NEEDED
Status: DISCONTINUED | OUTPATIENT
Start: 2024-09-25 | End: 2024-09-25 | Stop reason: HOSPADM

## 2024-09-25 RX ORDER — HYDROCODONE BITARTRATE AND ACETAMINOPHEN 5; 325 MG/1; MG/1
1 TABLET ORAL EVERY 6 HOURS PRN
Qty: 12 TABLET | Refills: 0 | Status: SHIPPED | OUTPATIENT
Start: 2024-09-25

## 2024-09-25 RX ORDER — MORPHINE SULFATE 2 MG/ML
2 INJECTION, SOLUTION INTRAMUSCULAR; INTRAVENOUS ONCE
Status: COMPLETED | OUTPATIENT
Start: 2024-09-25 | End: 2024-09-25

## 2024-09-25 RX ADMIN — SODIUM CHLORIDE 1000 ML: 9 INJECTION, SOLUTION INTRAVENOUS at 03:34

## 2024-09-25 RX ADMIN — METOCLOPRAMIDE 10 MG: 5 INJECTION, SOLUTION INTRAMUSCULAR; INTRAVENOUS at 03:35

## 2024-09-25 RX ADMIN — MORPHINE SULFATE 2 MG: 2 INJECTION, SOLUTION INTRAMUSCULAR; INTRAVENOUS at 03:35

## 2024-09-25 NOTE — ED PROVIDER NOTES
Subjective   History of Present Illness  29 year old  at unknown gestational age presents to the emergency department with concerns about pelvic cramping pain and heavy vaginal bleeding with passing of clots which began approximately four hours prior to arrival. She states she took a home pregnancy test shortly prior to arrival which was positive. She reports regular periods and denies recent nausea or breast tenderness.       Review of Systems   Constitutional:  Negative for diaphoresis and fever.   HENT:  Negative for facial swelling and nosebleeds.    Eyes:  Negative for photophobia and discharge.   Respiratory:  Negative for stridor.    Genitourinary:  Positive for pelvic pain and vaginal bleeding.       Past Medical History:   Diagnosis Date    Asthma     Chlamydia        No Known Allergies    Past Surgical History:   Procedure Laterality Date     SECTION N/A 2021    Procedure:  SECTION PRIMARY;  Surgeon: Alessandro Walker DO;  Location: [facility] LABOR DELIVERY;  Service: Obstetrics/Gynecology;  Laterality: N/A;    WISDOM TOOTH EXTRACTION         No family history on file.    Social History     Socioeconomic History    Marital status: Single   Tobacco Use    Smoking status: Every Day     Current packs/day: 0.50     Types: Cigarettes   Substance and Sexual Activity    Alcohol use: No    Drug use: Yes     Types: Hydrocodone, Marijuana    Sexual activity: Defer           Objective   Physical Exam  Vitals and nursing note reviewed.   Constitutional:       Appearance: She is not diaphoretic.   Eyes:      General: No scleral icterus.  Pulmonary:      Effort: Pulmonary effort is normal. No respiratory distress.   Abdominal:      General: There is no distension.      Palpations: Abdomen is soft.      Tenderness: There is abdominal tenderness (suprapubic).   Skin:     General: Skin is warm and dry.   Neurological:      Mental Status: She is alert.         Procedures           ED Course  ED Course  as of 09/25/24 0823   Wed Sep 25, 2024   0342 POC urine pregnancy positive [LD]   0352 HCG, Urine QL(!): Positive [LD]   0405 Hemoglobin: 12.9 [LD]   0406 Awaiting chemistry results and ultrasound. [LD]   0445 HCG Quantitative: 2,590.00 [LD]   0445 Awaiting ultrasound. [LD]   0510 Awaiting US interpretation. [LD]   0632 Patient is feeling better in terms of her pain. Results and plan discussed with the patient. All questions addressed. She does not have an OBGYN.  [LD]   0633 OBGYN Dr. Del Angel paged through [employer]. [LD]   0712 No response yet from Dr. Del Angel, medical society to repage. [LD]   0736 Dr. Del Angel did not respond on her cell phone, went straight to voicemail per [employer]. They are trying one more time to reach Dr. Del Angel. They were unable to leave a voicemail. [LD]   0812 Dr. Forester SCHMITT paged through [employer]. [LD]   0815 He wants to see her next week. [LD]      ED Course User Index  [LD] Morena Kang MD                                     [facility] reviewed by Morena Kang MD       Medical Decision Making  Problems Addressed:  Pelvic pain during pregnancy: complicated acute illness or injury  Vaginal bleeding affecting early pregnancy: complicated acute illness or injury    Amount and/or Complexity of Data Reviewed  External Data Reviewed: labs.     Details: AB positive blood type of review of prior records.  Labs: ordered. Decision-making details documented in ED Course.  Radiology: ordered. Decision-making details documented in ED Course.  Discussion of management or test interpretation with external provider(s): See ED course.    Risk  Prescription drug management.  Parenteral controlled substances.      Recent Results (from the past 24 hour(s))   hCG, Quantitative, Pregnancy    Collection Time: 09/25/24  3:33 AM    Specimen: Blood   Result Value Ref Range    HCG Quantitative 2,590.00 mIU/mL   Basic Metabolic Panel    Collection Time: 09/25/24  3:33 AM    Specimen:  Blood   Result Value Ref Range    Glucose 90 65 - 99 mg/dL    BUN 16 6 - 20 mg/dL    Creatinine 0.77 0.57 - 1.00 mg/dL    Sodium 135 (L) 136 - 145 mmol/L    Potassium 3.8 3.5 - 5.2 mmol/L    Chloride 99 98 - 107 mmol/L    CO2 26.0 22.0 - 29.0 mmol/L    Calcium 9.0 8.6 - 10.5 mg/dL    BUN/Creatinine Ratio 20.8 7.0 - 25.0    Anion Gap 10.0 5.0 - 15.0 mmol/L    eGFR 105.9 >60.0 mL/min/1.73   CBC Auto Differential    Collection Time: 09/25/24  3:33 AM    Specimen: Blood   Result Value Ref Range    WBC 10.12 3.40 - 10.80 10*3/mm3    RBC 4.28 3.77 - 5.28 10*6/mm3    Hemoglobin 12.9 12.0 - 15.9 g/dL    Hematocrit 38.2 34.0 - 46.6 %    MCV 89.3 79.0 - 97.0 fL    MCH 30.1 26.6 - 33.0 pg    MCHC 33.8 31.5 - 35.7 g/dL    RDW 13.0 12.3 - 15.4 %    RDW-SD 42.3 37.0 - 54.0 fl    MPV 11.1 6.0 - 12.0 fL    Platelets 244 140 - 450 10*3/mm3    Neutrophil % 51.4 42.7 - 76.0 %    Lymphocyte % 36.8 19.6 - 45.3 %    Monocyte % 9.0 5.0 - 12.0 %    Eosinophil % 2.0 0.3 - 6.2 %    Basophil % 0.5 0.0 - 1.5 %    Immature Grans % 0.3 0.0 - 0.5 %    Neutrophils, Absolute 5.21 1.70 - 7.00 10*3/mm3    Lymphocytes, Absolute 3.72 (H) 0.70 - 3.10 10*3/mm3    Monocytes, Absolute 0.91 (H) 0.10 - 0.90 10*3/mm3    Eosinophils, Absolute 0.20 0.00 - 0.40 10*3/mm3    Basophils, Absolute 0.05 0.00 - 0.20 10*3/mm3    Immature Grans, Absolute 0.03 0.00 - 0.05 10*3/mm3    nRBC 0.0 0.0 - 0.2 /100 WBC   Green Top (Gel)    Collection Time: 09/25/24  3:33 AM   Result Value Ref Range    Extra Tube Hold for add-ons.    Lavender Top    Collection Time: 09/25/24  3:33 AM   Result Value Ref Range    Extra Tube hold for add-on    Gold Top - SST    Collection Time: 09/25/24  3:33 AM   Result Value Ref Range    Extra Tube Hold for add-ons.    Gray Top    Collection Time: 09/25/24  3:33 AM   Result Value Ref Range    Extra Tube Hold for add-ons.    Light Blue Top    Collection Time: 09/25/24  3:33 AM   Result Value Ref Range    Extra Tube Hold for add-ons.    Urinalysis  With Microscopic If Indicated (No Culture) - Urine, Clean Catch    Collection Time: 09/25/24  3:43 AM    Specimen: Urine, Clean Catch   Result Value Ref Range    Color, UA Orange (A) Yellow, Straw    Appearance, UA Cloudy (A) Clear    pH, UA 5.5 5.0 - 8.0    Specific Gravity, UA >=1.030 1.001 - 1.030    Glucose, UA Negative Negative    Ketones, UA Negative Negative    Bilirubin, UA Negative Negative    Blood, UA Large (3+) (A) Negative    Protein,  mg/dL (2+) (A) Negative    Leuk Esterase, UA Trace (A) Negative    Nitrite, UA Negative Negative    Urobilinogen, UA 1.0 E.U./dL 0.2 - 1.0 E.U./dL   Urinalysis, Microscopic Only - Urine, Clean Catch    Collection Time: 09/25/24  3:43 AM    Specimen: Urine, Clean Catch   Result Value Ref Range    RBC, UA Too Numerous to Count (A) None Seen, 0-2 /HPF    WBC, UA 3-5 (A) None Seen, 0-2 /HPF    Bacteria, UA None Seen None Seen, Trace /HPF    Squamous Epithelial Cells, UA 3-6 (A) None Seen, 0-2 /HPF    Hyaline Casts, UA None Seen 0 - 6 /LPF    Methodology Manual Light Microscopy    POC Urine Pregnancy    Collection Time: 09/25/24  3:44 AM    Specimen: Urine   Result Value Ref Range    HCG, Urine, QL Positive (A) Negative    Lot Number [id number]     Internal Positive Control Positive Positive, Passed    Internal Negative Control Negative Negative, Passed    Expiration Date 1,364,518      Note: In addition to lab results from this visit, the labs listed above may include labs taken at another facility or during a different encounter within the last 24 hours. Please correlate lab times with ED admission and discharge times for further clarification of the services performed during this visit.    US Ob Transvaginal   Final Result   Impression:   Gestational sac is in the lower uterine segment. Cardiac activity is not detected within the fetal pole. Follow-up beta-hCG levels and consideration of repeat sonogram would be recommended if needed.            Electronically Signed:  Shady Hollis MD     9/25/2024 5:07 AM EDT     Workstation ID: [id number]        Vitals:    09/25/24 0700 09/25/24 0730 09/25/24 0800 09/25/24 0801   BP: 104/62 100/63 100/59    BP Location:       Patient Position:       Pulse: 91 86  88   Resp:       Temp:       TempSrc:       SpO2: 96% 99%  95%   Weight:       Height:         Medications   sodium chloride 0.9 % flush 10 mL (has no administration in time range)   sodium chloride 0.9 % bolus 1,000 mL (0 mL Intravenous Stopped 9/25/24 0709)   morphine injection 2 mg (2 mg Intravenous Given 9/25/24 0335)   metoclopramide (REGLAN) injection 10 mg (10 mg Intravenous Given 9/25/24 0335)     ECG/EMG Results (last 24 hours)       ** No results found for the last 24 hours. **          No orders to display          Final diagnoses:   Vaginal bleeding affecting early pregnancy   Pelvic pain during pregnancy       ED Disposition  ED Disposition       ED Disposition   Discharge    Condition   Stable    Comment   --               Jaret Hogue MD  [address] [facility] [address] [place], [place] [id number]  [phone]    On 9/30/2024  Dr. Hogue states his nurse will reach out to you to get you scheduled for follow up appointment for Monday 9/30/24. Pelvic rest, nothing in your vagina until OBGYN says it's OK.         Medication List        New Prescriptions      doxylamine-pyridoxine ER 20-20 MG tablet controlled-release tablet  Commonly known as: BONJESTA  Take 1 tablet by mouth At Night As Needed (nausea).     HYDROcodone-acetaminophen 5-325 MG per tablet  Commonly known as: NORCO  Take 1 tablet by mouth Every 6 (Six) Hours As Needed for Severe Pain.            Stop      acyclovir 400 MG tablet  Commonly known as: ZOVIRAX     docusate sodium 100 MG capsule     Prenatal Vitamin 27-0.8 MG tablet               Where to Get Your Medications        These medications were sent to [id number] - [place], [place] - [address] AT [facility] [address] -  [phone]  - [phone]   [address], [facility] [address]      Phone: [phone]   doxylamine-pyridoxine ER 20-20 MG tablet controlled-release tablet  HYDROcodone-acetaminophen 5-325 MG per tablet            Morena Kang MD  09/29/24 4833

## 2024-09-25 NOTE — DISCHARGE INSTRUCTIONS
Drink plenty of clear liquids to stay hydrated. Do not exceed 3000 mg acetaminophen per 24 hour period from all sources. Avoid ibuprofen/Advil/Motrin/Naproxen. Try to quit smoking. Return to the ER if you are saturating one pad or more per hour with blood from your vagina.

## 2025-08-17 ENCOUNTER — ANESTHESIA EVENT (OUTPATIENT)
Dept: LABOR AND DELIVERY | Facility: HOSPITAL | Age: 30
End: 2025-08-17
Payer: MEDICAID

## 2025-08-17 ENCOUNTER — ANESTHESIA (OUTPATIENT)
Dept: LABOR AND DELIVERY | Facility: HOSPITAL | Age: 30
End: 2025-08-17
Payer: MEDICAID

## 2025-08-17 ENCOUNTER — HOSPITAL ENCOUNTER (INPATIENT)
Facility: HOSPITAL | Age: 30
LOS: 3 days | Discharge: HOME | End: 2025-08-20
Attending: EMERGENCY MEDICINE | Admitting: OBSTETRICS & GYNECOLOGY
Payer: MEDICAID

## 2025-08-17 PROCEDURE — 25010000002 METOCLOPRAMIDE PER 10 MG: Performed by: ANESTHESIOLOGY

## 2025-08-17 PROCEDURE — 25010000002 FENTANYL CITRATE (PF) 50 MCG/ML SOLUTION: Performed by: ANESTHESIOLOGY

## 2025-08-17 PROCEDURE — 25010000002 OXYTOCIN PER 10 UNITS: Performed by: ANESTHESIOLOGY

## 2025-08-17 PROCEDURE — 25010000002 CEFAZOLIN PER 500 MG: Performed by: ANESTHESIOLOGY

## 2025-08-17 PROCEDURE — 25010000002 MORPHINE PER 10 MG: Performed by: ANESTHESIOLOGY

## 2025-08-17 PROCEDURE — 25010000002 MIDAZOLAM PER 1 MG: Performed by: ANESTHESIOLOGY

## 2025-08-17 PROCEDURE — 25010000002 FAMOTIDINE (PF) 20 MG/2ML SOLUTION: Performed by: ANESTHESIOLOGY

## 2025-08-17 PROCEDURE — 25810000003 LACTATED RINGERS PER 1000 ML: Performed by: OBSTETRICS & GYNECOLOGY

## 2025-08-17 PROCEDURE — 25010000002 BUPIVACAINE IN DEXTROSE 0.75-8.25 % SOLUTION: Performed by: ANESTHESIOLOGY

## 2025-08-17 PROCEDURE — 25010000002 ONDANSETRON PER 1 MG: Performed by: ANESTHESIOLOGY

## 2025-08-17 RX ORDER — ONDANSETRON 2 MG/ML
INJECTION INTRAMUSCULAR; INTRAVENOUS AS NEEDED
Status: DISCONTINUED | OUTPATIENT
Start: 2025-08-17 | End: 2025-08-17 | Stop reason: SURG

## 2025-08-17 RX ORDER — BUPIVACAINE HYDROCHLORIDE 7.5 MG/ML
INJECTION, SOLUTION INTRASPINAL
Status: COMPLETED | OUTPATIENT
Start: 2025-08-17 | End: 2025-08-17

## 2025-08-17 RX ORDER — OXYTOCIN/0.9 % SODIUM CHLORIDE 30/500 ML
PLASTIC BAG, INJECTION (ML) INTRAVENOUS AS NEEDED
Status: DISCONTINUED | OUTPATIENT
Start: 2025-08-17 | End: 2025-08-17 | Stop reason: SURG

## 2025-08-17 RX ORDER — MORPHINE SULFATE 0.5 MG/ML
INJECTION, SOLUTION EPIDURAL; INTRATHECAL; INTRAVENOUS AS NEEDED
Status: DISCONTINUED | OUTPATIENT
Start: 2025-08-17 | End: 2025-08-17 | Stop reason: SURG

## 2025-08-17 RX ORDER — CEFAZOLIN SODIUM 1 G/3ML
INJECTION, POWDER, FOR SOLUTION INTRAMUSCULAR; INTRAVENOUS AS NEEDED
Status: DISCONTINUED | OUTPATIENT
Start: 2025-08-17 | End: 2025-08-17 | Stop reason: SURG

## 2025-08-17 RX ORDER — FAMOTIDINE 10 MG/ML
INJECTION, SOLUTION INTRAVENOUS AS NEEDED
Status: DISCONTINUED | OUTPATIENT
Start: 2025-08-17 | End: 2025-08-17 | Stop reason: SURG

## 2025-08-17 RX ORDER — METOCLOPRAMIDE HYDROCHLORIDE 5 MG/ML
INJECTION INTRAMUSCULAR; INTRAVENOUS AS NEEDED
Status: DISCONTINUED | OUTPATIENT
Start: 2025-08-17 | End: 2025-08-17 | Stop reason: SURG

## 2025-08-17 RX ORDER — MIDAZOLAM HYDROCHLORIDE 1 MG/ML
INJECTION, SOLUTION INTRAMUSCULAR; INTRAVENOUS AS NEEDED
Status: DISCONTINUED | OUTPATIENT
Start: 2025-08-17 | End: 2025-08-17 | Stop reason: SURG

## 2025-08-17 RX ORDER — FENTANYL CITRATE 50 UG/ML
INJECTION, SOLUTION INTRAMUSCULAR; INTRAVENOUS AS NEEDED
Status: DISCONTINUED | OUTPATIENT
Start: 2025-08-17 | End: 2025-08-17 | Stop reason: SURG

## 2025-08-17 RX ORDER — OXYTOCIN 10 [USP'U]/ML
INJECTION, SOLUTION INTRAMUSCULAR; INTRAVENOUS AS NEEDED
Status: DISCONTINUED | OUTPATIENT
Start: 2025-08-17 | End: 2025-08-17 | Stop reason: SURG

## 2025-08-17 RX ADMIN — SODIUM CHLORIDE, POTASSIUM CHLORIDE, SODIUM LACTATE AND CALCIUM CHLORIDE: 600; 310; 30; 20 INJECTION, SOLUTION INTRAVENOUS at 16:08

## 2025-08-17 RX ADMIN — OXYTOCIN 3 UNITS: 10 INJECTION INTRAVENOUS at 16:35

## 2025-08-17 RX ADMIN — OXYTOCIN 3 UNITS: 10 INJECTION INTRAVENOUS at 16:27

## 2025-08-17 RX ADMIN — SODIUM CHLORIDE, POTASSIUM CHLORIDE, SODIUM LACTATE AND CALCIUM CHLORIDE: 600; 310; 30; 20 INJECTION, SOLUTION INTRAVENOUS at 16:43

## 2025-08-17 RX ADMIN — MORPHINE SULFATE 0.15 MG: 0.5 INJECTION, SOLUTION EPIDURAL; INTRATHECAL; INTRAVENOUS at 16:11

## 2025-08-17 RX ADMIN — CEFAZOLIN SODIUM 2 G: 1 INJECTION, POWDER, FOR SOLUTION INTRAMUSCULAR; INTRAVENOUS at 16:18

## 2025-08-17 RX ADMIN — FAMOTIDINE 20 MG: 10 INJECTION INTRAVENOUS at 16:17

## 2025-08-17 RX ADMIN — METOCLOPRAMIDE 10 MG: 5 INJECTION, SOLUTION INTRAMUSCULAR; INTRAVENOUS at 16:17

## 2025-08-17 RX ADMIN — OXYTOCIN 4 UNITS: 10 INJECTION INTRAVENOUS at 16:43

## 2025-08-17 RX ADMIN — Medication 1000 ML: at 16:26

## 2025-08-17 RX ADMIN — FENTANYL CITRATE 20 MCG: 50 INJECTION, SOLUTION INTRAMUSCULAR; INTRAVENOUS at 16:11

## 2025-08-17 RX ADMIN — MIDAZOLAM 1 MG: 1 INJECTION INTRAMUSCULAR; INTRAVENOUS at 16:37

## 2025-08-17 RX ADMIN — BUPIVACAINE HYDROCHLORIDE IN DEXTROSE 1.7 ML: 7.5 INJECTION, SOLUTION SUBARACHNOID at 16:11

## 2025-08-17 RX ADMIN — ONDANSETRON 4 MG: 2 INJECTION INTRAMUSCULAR; INTRAVENOUS at 16:17

## 2025-08-17 RX ADMIN — MIDAZOLAM 2 MG: 1 INJECTION INTRAMUSCULAR; INTRAVENOUS at 16:27

## 2025-08-21 ENCOUNTER — MATERNAL SCREENING (OUTPATIENT)
Dept: CALL CENTER | Facility: HOSPITAL | Age: 30
End: 2025-08-21
Payer: MEDICAID

## 2025-08-29 ENCOUNTER — MATERNAL SCREENING (OUTPATIENT)
Dept: CALL CENTER | Facility: HOSPITAL | Age: 30
End: 2025-08-29
Payer: MEDICAID

## (undated) DEVICE — SOL IRR H2O BTL 1000ML STRL

## (undated) DEVICE — PK C/SECT 10

## (undated) DEVICE — TRY SPINE BLCK WHITACRE 25G 3X5IN

## (undated) DEVICE — SKIN AFFIX SURG ADHESIVE 72/CS 0.55ML: Brand: MEDLINE

## (undated) DEVICE — SUT PROLN PS/2 3/0 8622H BX/36

## (undated) DEVICE — SUT PLAIN  3/0 CT1 27IN 842H

## (undated) DEVICE — GLV SURG BIOGEL LTX PF 7 1/2

## (undated) DEVICE — SUT GUT CHRM 2/0 CT1 27IN 811H

## (undated) DEVICE — MAT PREVALON MOBL TRANSFR AIR WO/PAD 39X80IN

## (undated) DEVICE — SOL IRR NACL 0.9PCT BT 1000ML

## (undated) DEVICE — SUT GUT CHRM 1 CTX 36IN 905H

## (undated) DEVICE — SUT PDS 0 CT1 36IN Z346H